# Patient Record
Sex: FEMALE | Race: BLACK OR AFRICAN AMERICAN | NOT HISPANIC OR LATINO | Employment: FULL TIME | URBAN - METROPOLITAN AREA
[De-identification: names, ages, dates, MRNs, and addresses within clinical notes are randomized per-mention and may not be internally consistent; named-entity substitution may affect disease eponyms.]

---

## 2017-05-11 ENCOUNTER — OFFICE VISIT (OUTPATIENT)
Dept: GASTROENTEROLOGY | Facility: CLINIC | Age: 42
End: 2017-05-11
Payer: COMMERCIAL

## 2017-05-11 ENCOUNTER — TELEPHONE (OUTPATIENT)
Dept: GASTROENTEROLOGY | Facility: CLINIC | Age: 42
End: 2017-05-11

## 2017-05-11 VITALS
WEIGHT: 224.19 LBS | SYSTOLIC BLOOD PRESSURE: 111 MMHG | DIASTOLIC BLOOD PRESSURE: 76 MMHG | BODY MASS INDEX: 37.35 KG/M2 | HEIGHT: 65 IN | HEART RATE: 109 BPM

## 2017-05-11 DIAGNOSIS — R14.0 BLOATING: ICD-10-CM

## 2017-05-11 DIAGNOSIS — K59.01 CONSTIPATION BY DELAYED COLONIC TRANSIT: Primary | ICD-10-CM

## 2017-05-11 DIAGNOSIS — K62.5 RECTAL BLEEDING: ICD-10-CM

## 2017-05-11 PROCEDURE — 99244 OFF/OP CNSLTJ NEW/EST MOD 40: CPT | Mod: S$GLB,,, | Performed by: INTERNAL MEDICINE

## 2017-05-11 PROCEDURE — 99999 PR PBB SHADOW E&M-EST. PATIENT-LVL III: CPT | Mod: PBBFAC,,, | Performed by: INTERNAL MEDICINE

## 2017-05-11 RX ORDER — PHENTERMINE HYDROCHLORIDE 37.5 MG/1
37.5 TABLET ORAL
COMMUNITY
End: 2019-12-30 | Stop reason: ALTCHOICE

## 2017-05-11 NOTE — MR AVS SNAPSHOT
[facility]  [facility] [address] [place] [address]  Phone: [phone]  Fax: [phone]                  Aaliyah Romo   2017 2:20 PM   Office Visit    Description:  Female : 1975   Provider:  Jeff Ash Jr., MD   Department:  [facility]           Reason for Visit     Abdominal Pain     Bloated     Constipation     Rectal Bleeding           Diagnoses this Visit        Comments    Constipation by delayed colonic transit    -  Primary     Rectal bleeding         Bloating                To Do List           Goals (5 Years of Data)     None      [facility]     [facility] On [facility] Care Line -  Assistance  Unless otherwise directed by your provider, please contact [facility], our nurse care line that is available for  assistance.     Registered nurses in the [facility] provide: appointment scheduling, clinical advisement, health education, and other advisory services.  Call: [phone] (toll free)               Medications           Message regarding Medications     Verify the changes and/or additions to your medication regime listed below are the same as discussed with your clinician today.  If any of these changes or additions are incorrect, please notify your healthcare provider.             Verify that the below list of medications is an accurate representation of the medications you are currently taking.  If none reported, the list may be blank. If incorrect, please contact your healthcare provider. Carry this list with you in case of emergency.           Current Medications     phentermine (ADIPEX-P) 37.5 mg tablet Take 37.5 mg by mouth before breakfast.    ibuprofen (ADVIL,MOTRIN) 600 MG tablet Take 1 tablet (600 mg total) by mouth every 6 (six) hours as needed for Pain.    naproxen (NAPROSYN) 500 MG tablet Take 1 tablet (500 mg total) by mouth 2 (two) times daily with meals.           Clinical Reference  "Information           Your Vitals Were     BP Pulse Height Weight BMI    111/76 (BP Location: Right arm, Patient Position: Sitting) 109 5' 5" (1.651 m) 101.7 kg (224 lb 3.2 oz) 37.31 kg/m2      Blood Pressure          Most Recent Value    BP  111/76      Allergies as of 5/11/2017     Vancomycin Analogues      Immunizations Administered on Date of Encounter - 5/11/2017     None      [facility] Sign-Up     Activating your [facility] account is as easy as 1-2-3!     1) Visit [email or web address], select Sign Up Now, enter this activation code and your date of birth, then select Next.  [email or web address]  Expires: 6/25/2017  3:29 PM      2) Create a username and password to use when you visit [facility] in the future and select a security question in case you lose your password and select Next.    3) Enter your e-mail address and click Sign Up!    Additional Information  If you have questions, please e-mail [email or web address] or call [phone] to talk to our [facility] staff. Remember, [facility] is NOT to be used for urgent needs. For medical emergencies, dial 911.         Instructions      Colonoscopy     A camera attached to a flexible tube with a viewing lens is used to take video pictures.     Colonoscopy is a test to view the inside of your lower digestive tract (colon and rectum). Sometimes it can show the last part of the small intestine (ileum). During the test, small pieces of tissue may be removed for testing. This is called a biopsy. Small growths, such as polyps, may also be removed.   Why is colonoscopy done?  The test is done to help look for colon cancer. And it can help find the source of abdominal pain, bleeding, and changes in bowel habits. It may be needed once a year, depending on factors such as your:  · Age  · Health history  · Family health history  · Symptoms  · Results from any prior colonoscopy  Risks and possible complications  These include:  · Bleeding               · A puncture or tear in the " colon   · Risks of anesthesia  · A cancer lesion not being seen  Getting ready   To prepare for the test:  · Talk with your healthcare provider about the risks of the test (see below). Also ask your healthcare provider about alternatives to the test.  · Tell your healthcare provider about any medicines you take. Also tell him or her about any health conditions you may have.  · Make sure your rectum and colon are empty for the test. Follow the diet and bowel prep instructions exactly. If you dont, the test may need to be rescheduled.  · Plan for a friend or family member to drive you home after the test.     Colonoscopy provides an inside view of the entire colon.     You may discuss the results with your doctor right away or at a future visit.  During the test   The test is usually done in the hospital on an outpatient basis. This means you go home the same day. The procedure takes about 30 minutes. During that time:  · You are given relaxing (sedating) medicine through an IV line. You may be drowsy, or fully asleep.  · The healthcare provider will first give you a physical exam to check for anal and rectal problems.  · Then the anus is lubricated and the scope inserted.  · If you are awake, you may have a feeling similar to needing to have a bowel movement. You may also feel pressure as air is pumped into the colon. Its OK to pass gas during the procedure.  · Biopsy, polyp removal, or other treatments may be done during the test.  After the test   You may have gas right after the test. It can help to try to pass it to help prevent later bloating. Your healthcare provider may discuss the results with you right away. Or you may need to schedule a follow-up visit to talk about the results. After the test, you can go back to your normal eating and other activities. You may be tired from the sedation and need to rest for a few hours.  Date Last Reviewed: 11/1/2016  © 0020-9875 [employer]. [address] [place] [address]  [place] [address]. All rights reserved. This information is not intended as a substitute for professional medical care. Always follow your healthcare professional's instructions.             Language Assistance Services     ATTENTION: Language assistance services are available, free of charge. Please call [phone].      ATENCIÓN: Si marcellala robin, tiene a robledo disposición servicios gratuitos de asistencia lingüística. Llame al [phone].     CHÚ Ý: N?u b?n nói Ti?ng Vi?t, có các d?ch v? h? tr? ngôn ng? mi?n phí dành cho b?n. G?i s? [phone].         [facility] complies with applicable Federal civil rights laws and does not discriminate on the basis of race, color, national origin, age, disability, or sex.

## 2017-05-11 NOTE — PROGRESS NOTES
Subjective:      Patient ID: Aaliyah Romo is a 41 y.o. female.    Chief Complaint: Abdominal Pain; Bloated; Constipation; and Rectal Bleeding (dark red )    HPI:   Patient 41-year-old female presenting for GI consult.  She is a history of chronic constipation.  Occasionally  Fecal impaction.  Has used MiraLAX occasionally in the past.  Has also used some herbal products likely containing senna.  Indicates she is not currently taking these products.  With constipation she has passed blood per rectum intermittently.  Dark red and at times copious.  GI systems review includes occasional nausea and generalized abdominal bloating and discomfort.  Admits to significant weight gain.  Started Adipex yesterday  Indicates she has had gallbladder stones in the past.  I see no surgical scars.  Has been told she had IBS in the past  Nonsmoker.  Nondrinker.  Family history negative for GI neoplasm.  Had a colonoscopy greater than 10 years ago.    Review of patient's allergies indicates:   Allergen Reactions    Vancomycin analogues Swelling     History reviewed. No pertinent past medical history.  Past Surgical History:   Procedure Laterality Date    BREAST SURGERY      CHOLECYSTECTOMY      COLONOSCOPY      TUBAL LIGATION       History reviewed. No pertinent family history.  Social History     Social History    Marital status: Single     Spouse name: N/A    Number of children: N/A    Years of education: N/A     Occupational History    Not on file.     Social History Main Topics    Smoking status: Never Smoker    Smokeless tobacco: Never Used    Alcohol use No    Drug use: No    Sexual activity: Not on file     Other Topics Concern    Not on file     Social History Narrative    No narrative on file       Review of Systems:  Constitutional: Negative for appetite change.  Weight gain.  Fatigue.    HENT: Negative for trouble swallowing.  Sinus congestion   Eyes: Negative for photophobia.   Respiratory: Negative  "for cough and shortness of breath.   Cardiovascular: Negative for palpitations.   Gastrointestinal: See HPI for details.  Genitourinary: Negative for frequency and hematuria.   Skin: Negative for rash.   Neurological: Negative for weakness .  Positive for headaches.   Musculoskeletal joint pains.  Low back pain.  Hematological: Negative.   Psychiatric/Behavioral: Negative for suicidal ideas and behavioral problems.  Anxiety.    Objective:     /76 (BP Location: Right arm, Patient Position: Sitting)  Pulse 109  Ht 5' 5" (1.651 m)  Wt 101.7 kg (224 lb 3.2 oz)  BMI 37.31 kg/m2    Physical Exam:  Eyes: Pupils are equal, round, and reactive to light.   Neck: Supple. No mass  Cardiovascular: Regular rhythm . No murmur   Pulmonary/Chest: Lungs clear   Abdominal: Soft. No mass palpated. Nontender, no guarding. Positive bowel sounds   Musculoskeletal: No deformity. No edema.   Psychiatric: Alert and oriented    Assessment:     1. Constipation by delayed colonic transit    2. Rectal bleeding    3. Bloating      Plan:     Aaliyah was seen today for abdominal pain, bloated, constipation and rectal bleeding.    Diagnoses and all orders for this visit:    Constipation by delayed colonic transit    Rectal bleeding    Bloating      Plan:  Daily MiraLAX  Citrate of magnesia on a weekly basis  Colonoscopy with extra prep    CC Dr. Allen  "

## 2017-05-11 NOTE — TELEPHONE ENCOUNTER
Patient is scheduled for Colonoscopy at [facility] on 05/16/2017. Prep information was given and explained to the patient at the office visit.

## 2017-05-11 NOTE — PATIENT INSTRUCTIONS
Colonoscopy     A camera attached to a flexible tube with a viewing lens is used to take video pictures.     Colonoscopy is a test to view the inside of your lower digestive tract (colon and rectum). Sometimes it can show the last part of the small intestine (ileum). During the test, small pieces of tissue may be removed for testing. This is called a biopsy. Small growths, such as polyps, may also be removed.   Why is colonoscopy done?  The test is done to help look for colon cancer. And it can help find the source of abdominal pain, bleeding, and changes in bowel habits. It may be needed once a year, depending on factors such as your:  · Age  · Health history  · Family health history  · Symptoms  · Results from any prior colonoscopy  Risks and possible complications  These include:  · Bleeding               · A puncture or tear in the colon   · Risks of anesthesia  · A cancer lesion not being seen  Getting ready   To prepare for the test:  · Talk with your healthcare provider about the risks of the test (see below). Also ask your healthcare provider about alternatives to the test.  · Tell your healthcare provider about any medicines you take. Also tell him or her about any health conditions you may have.  · Make sure your rectum and colon are empty for the test. Follow the diet and bowel prep instructions exactly. If you dont, the test may need to be rescheduled.  · Plan for a friend or family member to drive you home after the test.     Colonoscopy provides an inside view of the entire colon.     You may discuss the results with your doctor right away or at a future visit.  During the test   The test is usually done in the hospital on an outpatient basis. This means you go home the same day. The procedure takes about 30 minutes. During that time:  · You are given relaxing (sedating) medicine through an IV line. You may be drowsy, or fully asleep.  · The healthcare provider will first give you a physical exam to  check for anal and rectal problems.  · Then the anus is lubricated and the scope inserted.  · If you are awake, you may have a feeling similar to needing to have a bowel movement. You may also feel pressure as air is pumped into the colon. Its OK to pass gas during the procedure.  · Biopsy, polyp removal, or other treatments may be done during the test.  After the test   You may have gas right after the test. It can help to try to pass it to help prevent later bloating. Your healthcare provider may discuss the results with you right away. Or you may need to schedule a follow-up visit to talk about the results. After the test, you can go back to your normal eating and other activities. You may be tired from the sedation and need to rest for a few hours.  Date Last Reviewed: 11/1/2016  © 7895-0954 [employer]. [address] [place] [address], [place] [address]. All rights reserved. This information is not intended as a substitute for professional medical care. Always follow your healthcare professional's instructions.

## 2017-05-15 ENCOUNTER — TELEPHONE (OUTPATIENT)
Dept: GASTROENTEROLOGY | Facility: CLINIC | Age: 42
End: 2017-05-15

## 2017-05-15 DIAGNOSIS — K62.5 RECTAL BLEEDING: Primary | ICD-10-CM

## 2017-05-15 NOTE — TELEPHONE ENCOUNTER
Patient is scheduled in [facility] for Colonoscopy on 05/26/2017. Prep information was explained and given to patient at office visit.

## 2017-06-01 ENCOUNTER — TELEPHONE (OUTPATIENT)
Dept: GASTROENTEROLOGY | Facility: CLINIC | Age: 42
End: 2017-06-01

## 2017-06-01 DIAGNOSIS — K62.5 RECTAL BLEEDING: Primary | ICD-10-CM

## 2017-06-01 NOTE — TELEPHONE ENCOUNTER
----- Message from Anshu Otero sent at 5/31/2017 12:15 PM CDT -----  Contact: [phone]/self  Pt requesting to speak with the nurse about reschedule her colonoscopy.  Please advise

## 2017-08-05 ENCOUNTER — HOSPITAL ENCOUNTER (EMERGENCY)
Facility: HOSPITAL | Age: 42
Discharge: HOME | End: 2017-08-05
Attending: EMERGENCY MEDICINE
Payer: COMMERCIAL

## 2017-08-05 VITALS
SYSTOLIC BLOOD PRESSURE: 143 MMHG | OXYGEN SATURATION: 100 % | RESPIRATION RATE: 16 BRPM | DIASTOLIC BLOOD PRESSURE: 73 MMHG | HEIGHT: 65 IN | WEIGHT: 210 LBS | BODY MASS INDEX: 34.99 KG/M2 | TEMPERATURE: 99 F | HEART RATE: 94 BPM

## 2017-08-05 DIAGNOSIS — S80.01XA CONTUSION OF RIGHT KNEE, INITIAL ENCOUNTER: Primary | ICD-10-CM
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
## 2017-08-05 DIAGNOSIS — S93.401A SPRAIN OF RIGHT ANKLE, UNSPECIFIED LIGAMENT, INITIAL ENCOUNTER: ICD-10-CM

## 2017-08-05 LAB — B-HCG UR QL: NEGATIVE

## 2017-08-05 PROCEDURE — 99284 EMERGENCY DEPT VISIT MOD MDM: CPT

## 2017-08-05 PROCEDURE — 81025 URINE PREGNANCY TEST: CPT

## 2017-08-06 NOTE — ED PROVIDER NOTES
Encounter Date: 8/5/2017       History     Chief Complaint   Patient presents with    Fall     Slip and fall, c/o right ankle pain and lower back pain.  No deformity/swelling/ecchymosis noted to ankle, (+) distal pulses and cap refill     This patient is a 41-year-old female.  She was in the store, fell, landed on her right knee.  Is complaining of low back pain, knee pain, ankle pain.  No other injuries.  Did not hit her head, did not lose consciousness, denies chest pain or abdominal pain.          Review of patient's allergies indicates:   Allergen Reactions    Vancomycin analogues Swelling     History reviewed. No pertinent past medical history.  Past Surgical History:   Procedure Laterality Date    BREAST SURGERY      CHOLECYSTECTOMY      COLONOSCOPY      TUBAL LIGATION       History reviewed. No pertinent family history.  Social History   Substance Use Topics    Smoking status: Never Smoker    Smokeless tobacco: Never Used    Alcohol use No     Review of Systems   All other systems reviewed and are negative.      Physical Exam     Initial Vitals [08/05/17 2103]   BP Pulse Resp Temp SpO2   (!) 160/84 94 20 99.4 °F (37.4 °C) 99 %      MAP       109.33         Physical Exam    Nursing note and vitals reviewed.  Constitutional: She appears well-developed and well-nourished. She is not diaphoretic. No distress.   HENT:   Head: Normocephalic and atraumatic.   Right Ear: External ear normal.   Left Ear: External ear normal.   Mouth/Throat: Oropharynx is clear and moist.   Eyes: Conjunctivae and EOM are normal. Pupils are equal, round, and reactive to light.   Neck: Normal range of motion.   No cervical tenderness   Cardiovascular: Normal rate, regular rhythm, normal heart sounds and intact distal pulses.   Pulmonary/Chest: Breath sounds normal. No respiratory distress. She has no wheezes.   Abdominal: Soft. Bowel sounds are normal. She exhibits no distension. There is no tenderness.   Musculoskeletal: She  exhibits tenderness. She exhibits no edema.   Mild tenderness in the region of the right patella.  There is no effusion.  No surface marks such as contusions or abrasions.  All ligaments are stable with varus and valgus stress test, anterior drawer, no joint line tenderness.    Right ankle has mild tenderness anteriorly, no tenderness over the malleoli, Achilles, no joint effusion, full range of motion    Normal distal pulses and sensation    Normal gait   Neurological: She is alert and oriented to person, place, and time. She has normal strength. No sensory deficit.   Skin: Skin is warm and dry.   Psychiatric: She has a normal mood and affect.         ED Course   Procedures  Labs Reviewed   PREGNANCY TEST, URINE RAPID          X-Rays:   Independently Interpreted Readings:   Other Readings:  X-rays of the right knee and ankle were obtained by the triage nurse.  Show no evidence of fracture    Medical Decision Making:   Initial Assessment:   Fall, with knee contusion and minor ankle sprain.  Ibuprofen as needed for pain.  Follow-up with primary care physician.  No evidence of significant ligamentous injury or fracture  Differential Diagnosis:   Contusion  Sprain  Fracture                   ED Course     Clinical Impression:   The primary encounter diagnosis was Contusion of right knee, initial encounter. Diagnoses of Fall and Sprain of right ankle, unspecified ligament, initial encounter were also pertinent to this visit.    Disposition:   Disposition: Discharged  Condition: Stable                        Cullen Silver MD  08/05/17 4925

## 2017-10-11 ENCOUNTER — HOSPITAL ENCOUNTER (EMERGENCY)
Facility: HOSPITAL | Age: 42
Discharge: HOME | End: 2017-10-11
Payer: COMMERCIAL

## 2017-10-11 VITALS
RESPIRATION RATE: 16 BRPM | TEMPERATURE: 98 F | DIASTOLIC BLOOD PRESSURE: 72 MMHG | OXYGEN SATURATION: 98 % | HEART RATE: 91 BPM | SYSTOLIC BLOOD PRESSURE: 112 MMHG

## 2017-10-11 DIAGNOSIS — A59.9 TRICHIMONIASIS: ICD-10-CM

## 2017-10-11 DIAGNOSIS — F41.9 ANXIETY: Primary | ICD-10-CM

## 2017-10-11 LAB
ALBUMIN SERPL BCP-MCNC: 3.9 G/DL
ALP SERPL-CCNC: 148 U/L
ALT SERPL W/O P-5'-P-CCNC: 79 U/L
ANION GAP SERPL CALC-SCNC: 12 MMOL/L
AST SERPL-CCNC: 47 U/L
B-HCG UR QL: NEGATIVE
BASOPHILS # BLD AUTO: 0.05 K/UL
BASOPHILS NFR BLD: 0.5 %
BILIRUB SERPL-MCNC: 0.9 MG/DL
BILIRUB UR QL STRIP: NEGATIVE
BUN SERPL-MCNC: 10 MG/DL
CALCIUM SERPL-MCNC: 9.1 MG/DL
CHLORIDE SERPL-SCNC: 98 MMOL/L
CLARITY UR REFRACT.AUTO: ABNORMAL
CO2 SERPL-SCNC: 29 MMOL/L
COLOR UR AUTO: ABNORMAL
CREAT SERPL-MCNC: 1.07 MG/DL
DIFFERENTIAL METHOD: ABNORMAL
EOSINOPHIL # BLD AUTO: 0.3 K/UL
EOSINOPHIL NFR BLD: 2.6 %
ERYTHROCYTE [DISTWIDTH] IN BLOOD BY AUTOMATED COUNT: 14.2 %
EST. GFR  (AFRICAN AMERICAN): >60 ML/MIN/1.73 M^2
EST. GFR  (NON AFRICAN AMERICAN): >60 ML/MIN/1.73 M^2
GLUCOSE SERPL-MCNC: 101 MG/DL
GLUCOSE UR QL STRIP: NEGATIVE
HCT VFR BLD AUTO: 32.3 %
HGB BLD-MCNC: 11.2 G/DL
HGB UR QL STRIP: ABNORMAL
KETONES UR QL STRIP: NEGATIVE
LEUKOCYTE ESTERASE UR QL STRIP: ABNORMAL
LYMPHOCYTES # BLD AUTO: 2.3 K/UL
LYMPHOCYTES NFR BLD: 24 %
MCH RBC QN AUTO: 28.5 PG
MCHC RBC AUTO-ENTMCNC: 34.7 G/DL
MCV RBC AUTO: 82 FL
MICROSCOPIC COMMENT: ABNORMAL
MONOCYTES # BLD AUTO: 1 K/UL
MONOCYTES NFR BLD: 9.9 %
NEUTROPHILS # BLD AUTO: 5.9 K/UL
NEUTROPHILS NFR BLD: 62.1 %
NITRITE UR QL STRIP: NEGATIVE
PH UR STRIP: 6 [PH] (ref 5–8)
PLATELET # BLD AUTO: 256 K/UL
PMV BLD AUTO: 11.3 FL
POTASSIUM SERPL-SCNC: 3.2 MMOL/L
PROT SERPL-MCNC: 8.3 G/DL
PROT UR QL STRIP: ABNORMAL
RBC # BLD AUTO: 3.93 M/UL
RBC #/AREA URNS AUTO: 20 /HPF (ref 0–4)
SODIUM SERPL-SCNC: 139 MMOL/L
SP GR UR STRIP: 1.01 (ref 1–1.03)
TRICHOMONAS UR QL COMP ASSIST: ABNORMAL
TROPONIN I SERPL DL<=0.01 NG/ML-MCNC: <0.012 NG/ML
URN SPEC COLLECT METH UR: ABNORMAL
UROBILINOGEN UR STRIP-ACNC: ABNORMAL EU/DL
WBC # BLD AUTO: 9.58 K/UL
WBC #/AREA URNS AUTO: 15 /HPF (ref 0–5)

## 2017-10-11 PROCEDURE — 84484 ASSAY OF TROPONIN QUANT: CPT

## 2017-10-11 PROCEDURE — 80053 COMPREHEN METABOLIC PANEL: CPT

## 2017-10-11 PROCEDURE — 99284 EMERGENCY DEPT VISIT MOD MDM: CPT

## 2017-10-11 PROCEDURE — 93005 ELECTROCARDIOGRAM TRACING: CPT

## 2017-10-11 PROCEDURE — 93010 ELECTROCARDIOGRAM REPORT: CPT | Mod: ,,, | Performed by: INTERNAL MEDICINE

## 2017-10-11 PROCEDURE — 85025 COMPLETE CBC W/AUTO DIFF WBC: CPT

## 2017-10-11 PROCEDURE — 81000 URINALYSIS NONAUTO W/SCOPE: CPT

## 2017-10-11 PROCEDURE — 81025 URINE PREGNANCY TEST: CPT

## 2017-10-11 RX ORDER — METRONIDAZOLE 500 MG/1
500 TABLET ORAL EVERY 12 HOURS
Qty: 14 TABLET | Refills: 0 | Status: SHIPPED | OUTPATIENT
Start: 2017-10-11 | End: 2017-10-18

## 2017-10-11 RX ORDER — ALPRAZOLAM 0.5 MG/1
0.5 TABLET ORAL 2 TIMES DAILY PRN
Qty: 30 TABLET | Refills: 0 | Status: SHIPPED | OUTPATIENT
Start: 2017-10-11 | End: 2017-11-10

## 2017-10-11 NOTE — ED NOTES
Pt reports she feels better at this time. Pt has only eaten a banana at this time all day pt was at work when this happen.

## 2017-10-11 NOTE — ED PROVIDER NOTES
"Encounter Date: 10/11/2017       History     Chief Complaint   Patient presents with    Dizziness     Called EMS  because she was dizzy and almost "passed out" but did not. Recently getting over the flu and stopped taking her blood pressure medication a week because she thought she felt okay and didn't need it anymore.      42-year-old female presents to emergency room complaining of dizziness while at work today.  Patient states she began to experience left-sided arm tingling and dizziness while walking at work.  Denies any chest pain or shortness of breath.  Denies any injury.  States symptoms resolved after sitting.  Reports nausea but no vomiting.  States she just felt like she was about to passed out.  Patient reports a history of hypertension states she stopped taking her medications a week ago because she felt better and did not need them anymore.  Does not regularly check blood pressure.  Denies any blurry vision.  Denies any headache or head injury.          Review of patient's allergies indicates:   Allergen Reactions    Vancomycin analogues Swelling     History reviewed. No pertinent past medical history.  Past Surgical History:   Procedure Laterality Date    BREAST SURGERY      CHOLECYSTECTOMY      COLONOSCOPY      TUBAL LIGATION       History reviewed. No pertinent family history.  Social History   Substance Use Topics    Smoking status: Never Smoker    Smokeless tobacco: Never Used    Alcohol use No     Review of Systems   Constitutional: Negative for fever.   HENT: Negative for sore throat.    Respiratory: Negative for shortness of breath.    Cardiovascular: Negative for chest pain.   Gastrointestinal: Negative for nausea.   Genitourinary: Negative for dysuria.   Musculoskeletal: Negative for back pain.   Skin: Negative for rash.   Neurological: Positive for dizziness. Negative for weakness.   Hematological: Does not bruise/bleed easily.   All other systems reviewed and are " negative.      Physical Exam     Initial Vitals [10/11/17 1251]   BP Pulse Resp Temp SpO2   131/74 85 16 98 °F (36.7 °C) 98 %      MAP       93         Physical Exam    Nursing note and vitals reviewed.  Constitutional: She appears well-developed and well-nourished. No distress.   HENT:   Head: Normocephalic.   Eyes: Conjunctivae are normal.   Neck: Normal range of motion. Neck supple.   Cardiovascular: Normal rate.   Pulmonary/Chest: Breath sounds normal. No respiratory distress.   Abdominal: Soft. Bowel sounds are normal. She exhibits no distension and no abdominal bruit. There is no tenderness. There is no rebound and no guarding. No hernia.   Neurological: She is alert and oriented to person, place, and time.   Skin: Skin is warm and dry.   Psychiatric: Her behavior is normal. Judgment and thought content normal.   Tearful during assessment         ED Course   Procedures  Labs Reviewed   CBC W/ AUTO DIFFERENTIAL - Abnormal; Notable for the following:        Result Value    RBC 3.93 (*)     Hemoglobin 11.2 (*)     Hematocrit 32.3 (*)     All other components within normal limits   COMPREHENSIVE METABOLIC PANEL - Abnormal; Notable for the following:     Potassium 3.2 (*)     Alkaline Phosphatase 148 (*)     AST 47 (*)     ALT 79 (*)     All other components within normal limits   URINALYSIS - Abnormal; Notable for the following:     Appearance, UA Hazy (*)     Protein, UA Trace (*)     Occult Blood UA 3+ (*)     Urobilinogen, UA 4.0-6.0 (*)     Leukocytes, UA 2+ (*)     All other components within normal limits   URINALYSIS MICROSCOPIC - Abnormal; Notable for the following:     RBC, UA 20 (*)     WBC, UA 15 (*)     Trichomonas, UA Few (*)     All other components within normal limits   TROPONIN I   PREGNANCY TEST, URINE RAPID   PREGNANCY TEST, URINE RAPID             Medical Decision Making:   Initial Assessment:   42-year-old female presents to emergency room complaining of dizziness while at work today.  Patient  states she began to experience left-sided arm tingling and dizziness while walking at work.  Denies any chest pain or shortness of breath.  Denies any injury.  States symptoms resolved after sitting.  Reports nausea but no vomiting.  States she just felt like she was about to passed out.  Patient reports a history of hypertension states she stopped taking her medications a week ago because she felt better and did not need them anymore.  Does not regularly check blood pressure.  Denies any blurry vision.  Denies any headache or head injury.  Differential Diagnosis:   Vertigo, Ménière's disease, hypertension, angina, MI, CHF, CAD  Clinical Tests:   Lab Tests: Ordered and Reviewed  Radiological Study: Ordered and Reviewed  Medical Tests: Ordered and Reviewed  ED Management:  Present discussed the results with patient patient states she has been under a lot of stress lately due to the death of her nephew she raised.  States she returned to work this week but does not feel like she was ready.  States she is concerned about several family members and still attempting to do with the death of her boyfriend one year ago today.  Patient very tearful during reassessment.  States she has not been eating due to stress.  Denies suicidal or homicidal ideation.  Denies hallucinations.  States she just feels very sad at this moment.  Patient's workup is unremarkable.  I will treat the patient for Trichomonas was found a urine.  Patient has no symptoms.  I'll also give the patient medication for anxiety instructed patient to follow-up with counseling services in the area.  Return to emergency room if any symptoms worsen.  Eating well and hydrate well.  Rest.  Follow with primary care as needed.              Attending Attestation:     Physician Attestation Statement for NP/PA:   I discussed this assessment and plan of this patient with the NP/PA, but I did not personally examine the patient. The face to face encounter was performed by  the NP/PA.                  ED Course      Clinical Impression:   The primary encounter diagnosis was Anxiety. A diagnosis of Trichimoniasis was also pertinent to this visit.    Disposition:   Disposition: Discharged  Condition: Stable                        Raquel Dick NP  10/13/17 1649       Cullen Silver MD  10/21/17 0756

## 2017-10-19 ENCOUNTER — HOSPITAL ENCOUNTER (EMERGENCY)
Facility: HOSPITAL | Age: 42
Discharge: HOME | End: 2017-10-19
Attending: EMERGENCY MEDICINE
Payer: COMMERCIAL

## 2017-10-19 VITALS
WEIGHT: 218 LBS | DIASTOLIC BLOOD PRESSURE: 81 MMHG | HEIGHT: 65 IN | SYSTOLIC BLOOD PRESSURE: 133 MMHG | TEMPERATURE: 99 F | OXYGEN SATURATION: 100 % | BODY MASS INDEX: 36.32 KG/M2 | RESPIRATION RATE: 16 BRPM | HEART RATE: 85 BPM

## 2017-10-19 DIAGNOSIS — G51.0 BELL'S PALSY: Primary | ICD-10-CM

## 2017-10-19 LAB
ALBUMIN SERPL BCP-MCNC: 4.1 G/DL
ALP SERPL-CCNC: 84 U/L
ALT SERPL W/O P-5'-P-CCNC: 31 U/L
ANION GAP SERPL CALC-SCNC: 11 MMOL/L
AST SERPL-CCNC: 23 U/L
BASOPHILS # BLD AUTO: 0.02 K/UL
BASOPHILS NFR BLD: 0.2 %
BILIRUB SERPL-MCNC: 0.6 MG/DL
BUN SERPL-MCNC: 8 MG/DL
CALCIUM SERPL-MCNC: 9.5 MG/DL
CHLORIDE SERPL-SCNC: 101 MMOL/L
CO2 SERPL-SCNC: 28 MMOL/L
CREAT SERPL-MCNC: 0.95 MG/DL
DIFFERENTIAL METHOD: ABNORMAL
EOSINOPHIL # BLD AUTO: 0.1 K/UL
EOSINOPHIL NFR BLD: 1.3 %
ERYTHROCYTE [DISTWIDTH] IN BLOOD BY AUTOMATED COUNT: 14.6 %
EST. GFR  (AFRICAN AMERICAN): >60 ML/MIN/1.73 M^2
EST. GFR  (NON AFRICAN AMERICAN): >60 ML/MIN/1.73 M^2
GLUCOSE SERPL-MCNC: 112 MG/DL
HCT VFR BLD AUTO: 34.9 %
HGB BLD-MCNC: 11.4 G/DL
INR PPP: 1.3
LYMPHOCYTES # BLD AUTO: 3.2 K/UL
LYMPHOCYTES NFR BLD: 38.1 %
MCH RBC QN AUTO: 28.3 PG
MCHC RBC AUTO-ENTMCNC: 32.7 G/DL
MCV RBC AUTO: 87 FL
MONOCYTES # BLD AUTO: 0.5 K/UL
MONOCYTES NFR BLD: 5.9 %
NEUTROPHILS # BLD AUTO: 4.5 K/UL
NEUTROPHILS NFR BLD: 54.1 %
PLATELET # BLD AUTO: 400 K/UL
PMV BLD AUTO: 10.2 FL
POCT GLUCOSE: 116 MG/DL (ref 70–110)
POTASSIUM SERPL-SCNC: 3.7 MMOL/L
PROT SERPL-MCNC: 8.4 G/DL
PROTHROMBIN TIME: 13.7 SEC
RBC # BLD AUTO: 4.03 M/UL
SODIUM SERPL-SCNC: 140 MMOL/L
TSH SERPL DL<=0.005 MIU/L-ACNC: 1.1 UIU/ML
WBC # BLD AUTO: 8.35 K/UL

## 2017-10-19 PROCEDURE — 94760 N-INVAS EAR/PLS OXIMETRY 1: CPT

## 2017-10-19 PROCEDURE — 84443 ASSAY THYROID STIM HORMONE: CPT

## 2017-10-19 PROCEDURE — 80053 COMPREHEN METABOLIC PANEL: CPT

## 2017-10-19 PROCEDURE — 82962 GLUCOSE BLOOD TEST: CPT

## 2017-10-19 PROCEDURE — 99285 EMERGENCY DEPT VISIT HI MDM: CPT | Mod: 25

## 2017-10-19 PROCEDURE — 99203 OFFICE O/P NEW LOW 30 MIN: CPT | Mod: GT,,, | Performed by: PSYCHIATRY & NEUROLOGY

## 2017-10-19 PROCEDURE — 93005 ELECTROCARDIOGRAM TRACING: CPT

## 2017-10-19 PROCEDURE — 93010 ELECTROCARDIOGRAM REPORT: CPT | Mod: ,,, | Performed by: INTERNAL MEDICINE

## 2017-10-19 PROCEDURE — 85610 PROTHROMBIN TIME: CPT

## 2017-10-19 PROCEDURE — 27000221 HC OXYGEN, UP TO 24 HOURS

## 2017-10-19 PROCEDURE — 85025 COMPLETE CBC W/AUTO DIFF WBC: CPT

## 2017-10-19 RX ORDER — METHYLPREDNISOLONE 4 MG/1
TABLET ORAL
Qty: 1 PACKAGE | Refills: 0 | Status: SHIPPED | OUTPATIENT
Start: 2017-10-19 | End: 2017-11-09

## 2017-10-19 RX ORDER — HYDROCHLOROTHIAZIDE 12.5 MG/1
12.5 TABLET ORAL DAILY
COMMUNITY
End: 2022-04-13 | Stop reason: CLARIF

## 2017-10-19 RX ORDER — ASPIRIN 325 MG
325 TABLET ORAL
Status: DISCONTINUED | OUTPATIENT
Start: 2017-10-19 | End: 2017-10-19

## 2017-10-19 NOTE — HPI
41 y/o female who has been under a great deal of stress over the last few weeks and is also recovering from a significant flu illness last week. Developed HA yesterday and some blurred vision last nigh. This morning awoke with left facial weakness.

## 2017-10-19 NOTE — ASSESSMENT & PLAN NOTE
Patient has facial palsy with hyperauscusis and loss of taste. Left platysmas is paretic, All c/w Bell's on left.  Treat with steroids and anti-virals.

## 2017-10-19 NOTE — ED TRIAGE NOTES
Pt reports headache that started last night. Pt reports when she woke up with morning her face was not symmetrical. Left sided facial droop noted.

## 2017-10-19 NOTE — SUBJECTIVE & OBJECTIVE
Woke up with symptoms?: yes  Last known normal: Last known well (date and time)::  10/19/4512-4907  Recent bleeding noted: yes  Does the patient take any Blood Thinners? no  CT READ: Yes  No hemmorhage. No mass effect. No early infarct signs.     Past Medical History: hypertension and hyperlipidemia    Past Surgical History: no major surgeries within the last 2 weeks    Family History: no relevant family history    Social History: no smoking, no drinking, no drugs    Medications: antihypertensives    Allergies: No iodinated contrast allergy    Vancomycin Analogues    Review of Systems   Neurological: Positive for facial asymmetry and headaches.   All other systems reviewed and are negative.    Objective:   Vitals: BP: 138/82    Physical Exam   Constitutional: She is oriented to person, place, and time. She appears well-developed and well-nourished.   HENT:   Head: Normocephalic and atraumatic.   Eyes: EOM are normal. Pupils are equal, round, and reactive to light.   Neck: Normal range of motion. Neck supple.   Cardiovascular: Normal rate and regular rhythm.    Pulmonary/Chest: Effort normal.   Musculoskeletal: Normal range of motion.   Neurological: She is alert and oriented to person, place, and time. A cranial nerve deficit is present. GCS eye subscore is 4 - spontaneous. GCS verbal subscore is 5 - oriented. GCS motor subscore is 6 - obeys commands.   Vitals reviewed.      NIH Stroke Scale:  Interval: baseline (upon arrival/admit)  Level of Consciousness: 0 - alert  LOC Questions: 0 - answers both correctly  LOC Commands: 0 - performs both correctly  Best Gaze: 0 - normal  Visual: 0 - no visual loss  Facial Palsy: 2 - partial  Motor Left Arm: 0 - no drift  Motor Right Arm: 0 - no drift  Motor Left Le - no drift  Motor Right Le - no drift  Limb Ataxia: 0 - absent  Sensory: 0 - normal  Best Language: 0 - no aphasia  Dysarthria: 0 - normal articulation  Extinction and Inattention: 0 - no neglect  NIH Stroke  Scale Total: 2  Guy Coma Scale:  Best Eye Response: 4 - spontaneous  Best Motor Response: 6 - obeys commands  Best Verbal Response: 5 - oriented  Guy Coma Scale Total: 15  Modified Marilyn Scale:   Timeline: Prior to symptoms onset  Modified Glynn Score: 0 - no symptoms    ABCD2 Scale for TIA:   Age > or = 60: 0 - no  B/P or = 140/9 at Initial Evaluation: 1 - yes  Clinical Features of TIA: 0 - other symptoms  Duration of Symptoms: 2 - > or equal to 60 minutes  Diabetes Mellitus in History: 0 - no  ABCD2 Scale Total: 3  [id number] Scale:   Age: 0 - < 65 years old  CHF History: 0 - no  HTN History: 1 - yes  Stroke/TIA/Thromboembolism History: 0 - no  Vascular Disease History: 0 - no  Diabetes Mellitus in History: 0 - no  Female: 1 - yes  [id number] Scale Total: 2

## 2017-10-19 NOTE — ED PROVIDER NOTES
Encounter Date: 10/19/2017       History     Chief Complaint   Patient presents with    Facial Droop     Pt reports headache that started last night. Pt reports when she woke up with morning her face was not symmetrical. Left sided facial droop noted.    Headache     The history is provided by the patient.   Neurologic Problem   The primary symptoms include headaches, paresthesias, focal weakness and speech change. Primary symptoms do not include syncope, loss of consciousness, altered mental status, seizures, dizziness, visual change, loss of sensation, memory loss, fever, nausea or vomiting. The symptoms began 3 to 5 hours ago. The episode lasted 3 hours. The symptoms are unchanged. The neurological symptoms are focal. The symptoms occurred while sleeping.   The headache began yesterday. Headache is a new problem. The headache is present rarely. The pain from the headache is at a severity of 2/10. Location/region(s) of the headache: right unilateral. The headache is associated with neck stiffness and paresthesias. The headache is not associated with aura, photophobia or visual change.     Additional symptoms include neck stiffness. Additional symptoms do not include photophobia, aura, hallucinations or nystagmus. Medical issues do not include seizures, cerebral vascular accident, cancer, alcohol use, drug use, diabetes or hypertension.     Review of patient's allergies indicates:   Allergen Reactions    Vancomycin analogues Swelling     Past Medical History:   Diagnosis Date    Anxiety     Hypertension      Past Surgical History:   Procedure Laterality Date    BREAST SURGERY      CHOLECYSTECTOMY      COLONOSCOPY      TUBAL LIGATION       History reviewed. No pertinent family history.  Social History   Substance Use Topics    Smoking status: Never Smoker    Smokeless tobacco: Never Used    Alcohol use No     Review of Systems   Constitutional: Negative for fever.   Eyes: Negative for photophobia.    Cardiovascular: Negative for syncope.   Gastrointestinal: Negative for nausea and vomiting.   Musculoskeletal: Positive for neck stiffness.   Neurological: Positive for speech change, focal weakness, facial asymmetry, headaches and paresthesias. Negative for dizziness, seizures and loss of consciousness.   Psychiatric/Behavioral: Negative for hallucinations and memory loss.   All other systems reviewed and are negative.      Physical Exam     Initial Vitals [10/19/17 1017]   BP Pulse Resp Temp SpO2   (!) 169/90 99 20 -- 100 %      MAP       116.33         Physical Exam    Nursing note and vitals reviewed.  Constitutional: She appears well-developed and well-nourished.   HENT:   Head: Normocephalic and atraumatic.   Eyes: EOM are normal.   Neck: Normal range of motion. Neck supple. Thyromegaly present. JVD present.   Cardiovascular: Normal rate, regular rhythm, normal heart sounds and intact distal pulses.   Pulmonary/Chest: Breath sounds normal.   Abdominal: Soft.   Musculoskeletal: Normal range of motion.   Lymphadenopathy:     She has cervical adenopathy.   Neurological: No cranial nerve deficit. GCS eye subscore is 4. GCS verbal subscore is 5. GCS motor subscore is 6.   Patient has a facial droop most prominent at her mouth.  This is on the left.  The forehead is spared in this scenario.  Most likely this is a Bell's palsy but we will get the telemetry stroke involved   Skin: Skin is warm and dry. Capillary refill takes less than 2 seconds.   Psychiatric: She has a normal mood and affect. Her behavior is normal. Judgment and thought content normal.         ED Course   Procedures  Labs Reviewed   CBC W/ AUTO DIFFERENTIAL - Abnormal; Notable for the following:        Result Value    Hemoglobin 11.4 (*)     Hematocrit 34.9 (*)     RDW 14.6 (*)     Platelets 400 (*)     All other components within normal limits   POCT GLUCOSE - Abnormal; Notable for the following:     POCT Glucose 116 (*)     All other components  within normal limits   COMPREHENSIVE METABOLIC PANEL   PROTIME-INR   TSH   TROPONIN I   POCT GLUCOSE     EKG Readings: (Independently Interpreted)   Rhythm: Normal Sinus Rhythm. Heart Rate: 99. Ectopy: No Ectopy. Conduction: Normal. ST Segments: Normal ST Segments. T Waves: Normal. Clinical Impression: Normal Sinus Rhythm          Medical Decision Making:   ED Management:  I spoke with Dr. Beatty on the neurology telemetry stroke consult team.  She feels that this is about Bell's palsy as well as accepted the patient can go home on steroids                   ED Course      Clinical Impression:   The encounter diagnosis was Bell's palsy.    Disposition:   Disposition: Discharged  Condition: Stable                        Siena Muhammad MD  10/19/17 2519

## 2017-10-19 NOTE — CONSULTS
[facility]  Vascular Neurology  Comprehensive Stroke Center  Tele-Consultation Note    Teleconsult Time Documentation   Consults    Consulting Provider: Spoke Physician:: Sabina    Patient Location: [facility]  Spoke hospital nurse at bedside with patient assisting consultant.         Patient information was obtained from patient.       Subjective:     History of Present Illness:  41 y/o female who has been under a great deal of stress over the last few weeks and is also recovering from a significant flu illness last week. Developed HA yesterday and some blurred vision last nigh. This morning awoke with left facial weakness.      Woke up with symptoms?: yes  Last known normal: Last known well (date and time)::  10/19/9770-7991  Recent bleeding noted: yes  Does the patient take any Blood Thinners? no  CT READ: Yes  No hemmorhage. No mass effect. No early infarct signs.     Past Medical History: hypertension and hyperlipidemia    Past Surgical History: no major surgeries within the last 2 weeks    Family History: no relevant family history    Social History: no smoking, no drinking, no drugs    Medications: antihypertensives    Allergies: No iodinated contrast allergy    Vancomycin Analogues    Review of Systems   Neurological: Positive for facial asymmetry and headaches.   All other systems reviewed and are negative.    Objective:   Vitals: BP: 138/82    Physical Exam   Constitutional: She is oriented to person, place, and time. She appears well-developed and well-nourished.   HENT:   Head: Normocephalic and atraumatic.   Eyes: EOM are normal. Pupils are equal, round, and reactive to light.   Neck: Normal range of motion. Neck supple.   Cardiovascular: Normal rate and regular rhythm.    Pulmonary/Chest: Effort normal.   Musculoskeletal: Normal range of motion.   Neurological: She is alert and oriented to person, place, and time. A cranial nerve  "deficit is present. GCS eye subscore is 4 - spontaneous. GCS verbal subscore is 5 - oriented. GCS motor subscore is 6 - obeys commands.   Vitals reviewed.      NIH Stroke Scale:  Interval: baseline (upon arrival/admit)  Level of Consciousness: 0 - alert  LOC Questions: 0 - answers both correctly  LOC Commands: 0 - performs both correctly  Best Gaze: 0 - normal  Visual: 0 - no visual loss  Facial Palsy: 2 - partial  Motor Left Arm: 0 - no drift  Motor Right Arm: 0 - no drift  Motor Left Le - no drift  Motor Right Le - no drift  Limb Ataxia: 0 - absent  Sensory: 0 - normal  Best Language: 0 - no aphasia  Dysarthria: 0 - normal articulation  Extinction and Inattention: 0 - no neglect  NIH Stroke Scale Total: 2  Duncan Coma Scale:  Best Eye Response: 4 - spontaneous  Best Motor Response: 6 - obeys commands  Best Verbal Response: 5 - oriented  Duncan Coma Scale Total: 15  Modified Jenkins Scale:   Timeline: Prior to symptoms onset  Modified Jenkins Score: 0 - no symptoms    ABCD2 Scale for TIA:   Age > or = 60: 0 - no  B/P or = 140/9 at Initial Evaluation: 1 - yes  Clinical Features of TIA: 0 - other symptoms  Duration of Symptoms: 2 - > or equal to 60 minutes  Diabetes Mellitus in History: 0 - no  ABCD2 Scale Total: 3  [id number] Scale:   Age: 0 - < 65 years old  CHF History: 0 - no  HTN History: 1 - yes  Stroke/TIA/Thromboembolism History: 0 - no  Vascular Disease History: 0 - no  Diabetes Mellitus in History: 0 - no  Female: 1 - yes  [id number] Scale Total: 2            Assessment/Plan:     Diagnoses:   Left-sided Bell's palsy    Patient has facial palsy with hyperauscusis and loss of taste. Left platysmas is paretic, All c/w Bell's on left.  Treat with steroids and anti-virals.            Blood pressure 138/88, pulse 105, resp. rate 18, height 5' 5" (1.651 m), weight 98.9 kg (218 lb), last menstrual period 10/05/2017, SpO2 100 %, not currently breastfeeding.  Alteplase Eligible?: No  Alteplase " Recommendation: Alteplase not recommended due to Bell's Palsy  Possible Interventional Revascularization Candidate? No; No large vessel occlusion    Recommendation: NPO until after pass dysphagia screen. Diagnostic studies: MRI head without contrast to assess brain parenchyma    Disposition Recommendation: discharge from ED    Recommended the emergency room physician to have a brief discussion with the patient and/or family if available regarding the risks and benefits of treatment, and to briefly document the occurrence of that discussion in his clinical encounter note.     The attending portion of this evaluation, treatment, and documentation was performed per Brittany Carmona MD via audiovisual.    Billing code:  (non-stroke, some mimics)    · This patient has neurological symptom(s)/condition/illness, with minimal potential for morbidity and mortality.  · There is a low probability for acute neurological change leading to clinical and possibly life-threatening deterioration requiring highest level of physician preparedness for urgent intervention.  · Care was coordinated with other physicians involved in the patient's care.  · Radiologic studies and laboratory data were reviewed and interpreted, and plan of care was re-assessed based on the results.  · Diagnosis, treatment options and prognosis may have been discussed with the patient and/or family members or caregiver.        Brittany Carmona MD  Comprehensive Stroke Center  Vascular Neurology   [facility]

## 2017-10-25 ENCOUNTER — HOSPITAL ENCOUNTER (EMERGENCY)
Facility: HOSPITAL | Age: 42
Discharge: HOME | End: 2017-10-25
Attending: FAMILY MEDICINE
Payer: COMMERCIAL

## 2017-10-25 VITALS
WEIGHT: 216 LBS | RESPIRATION RATE: 16 BRPM | BODY MASS INDEX: 35.99 KG/M2 | HEIGHT: 65 IN | HEART RATE: 71 BPM | SYSTOLIC BLOOD PRESSURE: 128 MMHG | TEMPERATURE: 98 F | DIASTOLIC BLOOD PRESSURE: 78 MMHG

## 2017-10-25 DIAGNOSIS — R51.9 NONINTRACTABLE HEADACHE, UNSPECIFIED CHRONICITY PATTERN, UNSPECIFIED HEADACHE TYPE: Primary | ICD-10-CM

## 2017-10-25 PROCEDURE — 25000003 PHARM REV CODE 250: Performed by: FAMILY MEDICINE

## 2017-10-25 PROCEDURE — 99283 EMERGENCY DEPT VISIT LOW MDM: CPT

## 2017-10-25 RX ORDER — KETOROLAC TROMETHAMINE 10 MG/1
10 TABLET, FILM COATED ORAL
Status: COMPLETED | OUTPATIENT
Start: 2017-10-25 | End: 2017-10-25

## 2017-10-25 RX ADMIN — KETOROLAC TROMETHAMINE 10 MG: 10 TABLET, FILM COATED ORAL at 10:10

## 2017-10-25 NOTE — ED PROVIDER NOTES
Encounter Date: 10/25/2017       History     Chief Complaint   Patient presents with    Headache     multiple complaints. states she has headaches, diagnosed with bell's palsy , also has right ear pain.      Patient has multiple complaints today.  She complains of mild headache and right ear pain and also she had Bell's [place] her right side 2 days ago.  Patient also says she is weak and wants to take off from work.  Denies any nausea or vomiting.  No blurring of vision.  Patient also has history of anxiety and says she lost her nephew and is feeling anxious.  Patient says she's been seen by her PCP and prescribed pain medication but she did not go to the pharmacy to fill it yet.  He is mainly here for her excuse.      The history is provided by the patient.     Review of patient's allergies indicates:   Allergen Reactions    Vancomycin analogues Swelling     Past Medical History:   Diagnosis Date    Anxiety     Hypertension      Past Surgical History:   Procedure Laterality Date    BREAST SURGERY      CHOLECYSTECTOMY      COLONOSCOPY      TUBAL LIGATION       No family history on file.  Social History   Substance Use Topics    Smoking status: Never Smoker    Smokeless tobacco: Never Used    Alcohol use No     Review of Systems   Constitutional: Negative for activity change, appetite change and fever.   HENT: Negative for congestion, ear pain, facial swelling, sinus pain, sinus pressure and sore throat.    Eyes: Negative for pain, discharge and itching.   Respiratory: Negative for cough, shortness of breath and wheezing.    Cardiovascular: Negative for chest pain and palpitations.   Gastrointestinal: Negative for abdominal distention, abdominal pain, diarrhea and nausea.   Genitourinary: Negative for dysuria, flank pain and frequency.   Musculoskeletal: Negative for back pain, gait problem and myalgias.   Neurological: Positive for facial asymmetry and headaches. Negative for dizziness, tremors, seizures,  speech difficulty, light-headedness and numbness.   All other systems reviewed and are negative.      Physical Exam     Initial Vitals [10/25/17 1007]   BP Pulse Resp Temp SpO2   126/75 78 18 98.4 °F (36.9 °C) --      MAP       92         Physical Exam    Nursing note and vitals reviewed.  Constitutional: She appears well-developed and well-nourished.   HENT:   Head: Normocephalic.   Right Ear: External ear normal.   Left Ear: External ear normal.   Nose: Nose normal.   Mouth/Throat: Oropharynx is clear and moist.   Eyes: Conjunctivae and EOM are normal. Pupils are equal, round, and reactive to light.   Neck: Normal range of motion.   Cardiovascular: Normal rate, normal heart sounds and intact distal pulses.   Pulmonary/Chest: Breath sounds normal. No respiratory distress. She has no wheezes. She has no rhonchi. She has no rales. She exhibits no tenderness.   Abdominal: Soft. Bowel sounds are normal.   Musculoskeletal: Normal range of motion.   Neurological: She is alert and oriented to person, place, and time. She has normal strength and normal reflexes. She displays normal reflexes. She displays a negative Romberg sign. GCS eye subscore is 4. GCS verbal subscore is 5. GCS motor subscore is 6.   Right facial pain as he had a CT negative   Skin: Skin is warm. Capillary refill takes less than 2 seconds.         ED Course   Procedures  Labs Reviewed - No data to display          Medical Decision Making:   Initial Assessment:   Pt presents with multiple symptoms- has been diagnosed with bells palsy.  Headache and anxiety.  Differential Diagnosis:   CVA, [place] palsy, Headache, migraine, anxiety.  ED Management:  Pt symptoms have improved , no acute change in neurological evaluation.  Pt is advised to f/u PCP and PT/OT for bells palsy.  Pt return to ED with any serious headaches , nausea, vomiting or new weakness.                   ED Course      Clinical Impression:   The encounter diagnosis was Nonintractable headache,  unspecified chronicity pattern, unspecified headache type.    Disposition:   Disposition: Discharged  Condition: Bernice Wagner MD  11/03/17 0042

## 2017-12-04 ENCOUNTER — CLINICAL SUPPORT (OUTPATIENT)
Dept: URGENT CARE | Facility: CLINIC | Age: 42
End: 2017-12-04

## 2017-12-04 DIAGNOSIS — Z23 ENCOUNTER FOR IMMUNIZATION: Primary | ICD-10-CM

## 2017-12-04 PROCEDURE — 86580 TB INTRADERMAL TEST: CPT | Mod: S$GLB,,,

## 2017-12-07 ENCOUNTER — CLINICAL SUPPORT (OUTPATIENT)
Dept: URGENT CARE | Facility: CLINIC | Age: 42
End: 2017-12-07

## 2017-12-07 DIAGNOSIS — Z23 ENCOUNTER FOR IMMUNIZATION: Primary | ICD-10-CM

## 2017-12-07 LAB
TB INDURATION - 48 HR READ: NORMAL MM
TB INDURATION - 72 HR READ: NORMAL MM
TB SKIN TEST - 48 HR READ: NEGATIVE
TB SKIN TEST - 72 HR READ: NORMAL

## 2017-12-12 ENCOUNTER — HOSPITAL ENCOUNTER (EMERGENCY)
Facility: HOSPITAL | Age: 42
Discharge: HOME | End: 2017-12-12
Payer: COMMERCIAL

## 2017-12-12 VITALS
OXYGEN SATURATION: 98 % | HEART RATE: 105 BPM | WEIGHT: 198 LBS | SYSTOLIC BLOOD PRESSURE: 132 MMHG | BODY MASS INDEX: 32.95 KG/M2 | DIASTOLIC BLOOD PRESSURE: 80 MMHG | TEMPERATURE: 98 F | RESPIRATION RATE: 18 BRPM

## 2017-12-12 DIAGNOSIS — M79.2 NEURALGIA: ICD-10-CM

## 2017-12-12 DIAGNOSIS — B02.29 POSTHERPETIC NEURALGIA: Primary | ICD-10-CM

## 2017-12-12 DIAGNOSIS — J02.8 SORE THROAT (VIRAL): ICD-10-CM

## 2017-12-12 DIAGNOSIS — B97.89 SORE THROAT (VIRAL): ICD-10-CM

## 2017-12-12 LAB — DEPRECATED S PYO AG THROAT QL EIA: NEGATIVE

## 2017-12-12 PROCEDURE — 87880 STREP A ASSAY W/OPTIC: CPT

## 2017-12-12 PROCEDURE — 87081 CULTURE SCREEN ONLY: CPT

## 2017-12-12 PROCEDURE — 99283 EMERGENCY DEPT VISIT LOW MDM: CPT

## 2017-12-12 RX ORDER — METHYLPREDNISOLONE 4 MG/1
TABLET ORAL
Qty: 1 PACKAGE | Refills: 0 | Status: SHIPPED | OUTPATIENT
Start: 2017-12-12 | End: 2018-01-02

## 2017-12-12 RX ORDER — GABAPENTIN 300 MG/1
300 CAPSULE ORAL 3 TIMES DAILY
Qty: 90 CAPSULE | Refills: 0 | Status: SHIPPED | OUTPATIENT
Start: 2017-12-12 | End: 2018-12-12

## 2017-12-13 NOTE — ED PROVIDER NOTES
Encounter Date: 12/12/2017       History     Chief Complaint   Patient presents with    Headache     headache that started 2 weeks ago, recently dx. with bells palsy. sore throat for 2 days    Sore Throat     42-year-old female presents to emergency room complaining of facial pain and headache and sore throat for the past one week.  Patient states she was recently diagnosed with Bell's palsy approximately 2 weeks ago and began to experience facial pain as the symptoms from Bell's palsy resolved.  Patient states yesterday she began to have a sore throat and runny nose.  Has not taken any medications for symptoms.  Patient denies any fever.  Denies any cough.          Review of patient's allergies indicates:   Allergen Reactions    Vancomycin analogues Swelling     Past Medical History:   Diagnosis Date    Anxiety     Bell's palsy     Hypertension      Past Surgical History:   Procedure Laterality Date    BREAST SURGERY      CHOLECYSTECTOMY      COLONOSCOPY      TUBAL LIGATION       History reviewed. No pertinent family history.  Social History   Substance Use Topics    Smoking status: Never Smoker    Smokeless tobacco: Never Used    Alcohol use No     Review of Systems   Constitutional: Negative for fever.   HENT: Positive for sore throat.    Respiratory: Negative for shortness of breath.    Cardiovascular: Negative for chest pain.   Gastrointestinal: Negative for nausea.   Genitourinary: Negative for dysuria.   Musculoskeletal: Negative for back pain.   Skin: Negative for rash.   Neurological: Positive for headaches. Negative for weakness.   Hematological: Does not bruise/bleed easily.   All other systems reviewed and are negative.      Physical Exam     Initial Vitals [12/12/17 2027]   BP Pulse Resp Temp SpO2   132/80 (!) 116 20 98 °F (36.7 °C) 99 %      MAP       97.33         Physical Exam    Nursing note and vitals reviewed.  Constitutional: She appears well-developed and well-nourished. No  distress.   HENT:   Head: Normocephalic.   Right Ear: Tympanic membrane normal.   Left Ear: Tympanic membrane normal.   Mouth/Throat: Uvula is midline and mucous membranes are normal. No oropharyngeal exudate or posterior oropharyngeal erythema.   Eyes: Conjunctivae are normal.   Neck: Normal range of motion. Neck supple.   Cardiovascular: Normal rate.   Pulmonary/Chest: Breath sounds normal. No respiratory distress.   Abdominal: Soft. Bowel sounds are normal. She exhibits no distension and no abdominal bruit. There is no tenderness. There is no rebound and no guarding. No hernia.   Neurological: She is alert and oriented to person, place, and time. She has normal strength. No cranial nerve deficit or sensory deficit. GCS eye subscore is 4. GCS verbal subscore is 5. GCS motor subscore is 6.   Skin: Skin is warm and dry.   Psychiatric: She has a normal mood and affect. Her behavior is normal. Judgment and thought content normal.         ED Course   Procedures  Labs Reviewed   THROAT SCREEN, RAPID   CULTURE, STREP A,  THROAT             Medical Decision Making:   Initial Assessment:   42-year-old female presents to emergency room complaining of facial pain and headache and sore throat for the past one week.  Patient states she was recently diagnosed with Bell's palsy approximately 2 weeks ago and began to experience facial pain as the symptoms from Bell's palsy resolved.  Patient states yesterday she began to have a sore throat and runny nose.  Has not taken any medications for symptoms.  Patient denies any fever.  Denies any cough.  Differential Diagnosis:   Viral syndrome, postherpetic neuralgia, migraine, CVA, strep throat, influenza  Clinical Tests:   Lab Tests: Ordered and Reviewed  ED Management:  I will discharge patient with Neurontin and steroids for symptoms.  Patient instructed to rest and hydrate well.  Follow-up primary care doctor.                   ED Course      Clinical Impression:   The primary  encounter diagnosis was Postherpetic neuralgia. Diagnoses of Neuralgia and Sore throat (viral) were also pertinent to this visit.                           Raquel Dick NP  12/12/17 5685

## 2017-12-16 LAB — BACTERIA THROAT CULT: NORMAL

## 2018-05-03 ENCOUNTER — HOSPITAL ENCOUNTER (EMERGENCY)
Facility: HOSPITAL | Age: 43
Discharge: HOME | End: 2018-05-03
Payer: COMMERCIAL

## 2018-05-03 VITALS
OXYGEN SATURATION: 98 % | DIASTOLIC BLOOD PRESSURE: 86 MMHG | HEART RATE: 92 BPM | SYSTOLIC BLOOD PRESSURE: 140 MMHG | BODY MASS INDEX: 33.28 KG/M2 | RESPIRATION RATE: 18 BRPM | TEMPERATURE: 100 F | WEIGHT: 200 LBS

## 2018-05-03 PROCEDURE — 99283 EMERGENCY DEPT VISIT LOW MDM: CPT

## 2018-05-03 PROCEDURE — 25000003 PHARM REV CODE 250: Performed by: NURSE PRACTITIONER

## 2018-05-03 RX ORDER — ONDANSETRON 4 MG/1
4 TABLET, FILM COATED ORAL EVERY 8 HOURS PRN
Qty: 12 TABLET | Refills: 0 | OUTPATIENT
Start: 2018-05-03 | End: 2020-04-01

## 2018-05-03 RX ORDER — ONDANSETRON 4 MG/1
4 TABLET, ORALLY DISINTEGRATING ORAL
Status: COMPLETED | OUTPATIENT
Start: 2018-05-03 | End: 2018-05-03

## 2018-05-03 RX ADMIN — ONDANSETRON 4 MG: 4 TABLET, ORALLY DISINTEGRATING ORAL at 01:05

## 2018-05-03 NOTE — ED PROVIDER NOTES
New Prescriptions    ONDANSETRON (ZOFRAN) 4 MG TABLET    Take 1 tablet (4 mg total) by mouth every 8 (eight) hours as needed for Nausea.    eMERGENCY dEPARTMENT eNCOUnter    CHIEF COMPLAINT    Chief Complaint   Patient presents with    Vomiting     was eating a subway sandwich and got tingling sensation on her tongue. than she became nausated and started vomiting       HPI    Aaliyah Romo is a 42 y.o. female who presents to the ED while eating a subway Tuna sandwich, tongue felt tingly, then started vomiting. Feels very nauseated and has vomited 5 times since 10 a.m. She denies abdominal pain or diarrhea. She denies fever.      CURRENT MEDICATIONS    No current facility-administered medications on file prior to encounter.      Current Outpatient Prescriptions on File Prior to Encounter   Medication Sig Dispense Refill    alprazolam (XANAX) 0.5 MG tablet Take 1 tablet (0.5 mg total) by mouth 2 (two) times daily as needed for Anxiety. 30 tablet 0    gabapentin (NEURONTIN) 300 MG capsule Take 1 capsule (300 mg total) by mouth 3 (three) times daily. 90 capsule 0    hydrochlorothiazide (HYDRODIURIL) 12.5 MG Tab Take 12.5 mg by mouth once daily.      ibuprofen (ADVIL,MOTRIN) 600 MG tablet Take 1 tablet (600 mg total) by mouth every 6 (six) hours as needed for Pain. 20 tablet 0    naproxen (NAPROSYN) 500 MG tablet Take 1 tablet (500 mg total) by mouth 2 (two) times daily with meals. 60 tablet 0    phentermine (ADIPEX-P) 37.5 mg tablet Take 37.5 mg by mouth before breakfast.           ALLERGIES    Review of patient's allergies indicates:   Allergen Reactions    Vancomycin analogues Swelling       PAST MEDICAL HISTORY  Past Medical History:   Diagnosis Date    Anxiety     Bell's palsy     Hypertension        SURGICAL HISTORY    Past Surgical History:   Procedure Laterality Date    BREAST SURGERY      CHOLECYSTECTOMY      COLONOSCOPY      TUBAL LIGATION         SOCIAL HISTORY    Social History      Social History    Marital status: Single     Spouse name: N/A    Number of children: N/A    Years of education: N/A     Social History Main Topics    Smoking status: Never Smoker    Smokeless tobacco: Never Used    Alcohol use No    Drug use: No    Sexual activity: Not on file     Other Topics Concern    Not on file     Social History Narrative    No narrative on file       FAMILY HISTORY    No family history on file.    REVIEW OF SYSTEMS   ROS  Constitutional:  No fever, chills, weight loss or weakness.   Eyes:  No  Photophobia, blurred vision or discharge.   HENT:  No ear pain, nasal congestion or sore throat..  Respiratory:  No cough, shortness of breath or wheezing.   Cardiovascular:  No chest pain, palpitations or swelling.   GI:  No abdominal pain, reports nausea and vomiting, no diarrhea.  : No dysuria, frequency   Musculoskeletal:  No back pain or neck pain.   Skin:  No reported rashes or infected lesions.   Neurologic:  No reported headache.  All Systems otherwise negative except as noted in the History of Present Illness.        PHYSICAL EXAM    Reviewed Triage Note  VITAL SIGNS: BP (!) 140/86 (BP Location: Right arm, Patient Position: Sitting)   Pulse 92   Temp 99.5 °F (37.5 °C) (Oral)   Resp 18   Wt 90.7 kg (200 lb)   SpO2 98%   BMI 33.28 kg/m²    Vitals:    05/03/18 1314   BP: (!) 140/86   Pulse: 92   Resp: 18   Temp: 99.5 °F (37.5 °C)       Physical Exam  Nursing Notes and Vital Signs Reviewed  Constitutional:  Well-developed, well-nourished, middle aged female in NAD.  HENT:  Normocephalic, atraumatic. Bilateral external EACs normal, Bilateral TMs normal. Nose normal, no nasal mucosal edema, no rhinorrhea. Mouth mucus membranes P & M, no oral lesions. Oropharynx no erythema, edema or exudate, uvula midline.   Eyes:  PERRL EOMI. Conjunctiva normal without discharge.   Neck: Normal range of motion. No midline tenderness or vertebral step-off. No stridor. No meningismus. No  lymphadenopathy.   Respiratory:  Normal breath sounds bilaterally.  No respiratory distress, retractions, or conversational dyspnea. No wheezing. No rhonchi. No rales.   Cardiovascular:  Normal heart rate. Normal rhythm. No pitting lower extremity edema.   GI:  Abdomen soft, non-distended, tender LUQ &LLQ. Normal bowel sounds. No guarding, rigidity or rebound.    : No CVA tenderness.   Integument:  Warm and dry. No rash. No petechiae  Neurologic:   Alert and Interactive. MAEW. Gait steady.  Psychiatric:  Affect normal. Mood normal.         LABS  Pertinent labs reviewed. (See chart for details)           RADIOLOGY    Imaging Results    None         PROCEDURES    Procedures      EKG         ED COURSE & MEDICAL DECISION MAKING    Pertinent & Imaging studies reviewed. (See chart for details and specific orders.)  NO fever, abdominal pain. Zofran given in ED. Tolerated powerade without vomiting. Zorfran Rx. Clear liquids today. F/u PCP tomorrow. Return if worsening or concerns.    Medications   ondansetron disintegrating tablet 4 mg (4 mg Oral Given 5/3/18 1340)           FINAL IMPRESSION    1. Food poisoning, accidental or unintentional, initial encounter        Differential Diagnosis: Appendicitis                                       Cholecystitis              viral gastroenteritis      Patient advised to follow-up with PCP for re-check and BP re-check.                   Kanchan Cabral NP  05/03/18 1694

## 2018-06-27 ENCOUNTER — HOSPITAL ENCOUNTER (EMERGENCY)
Facility: HOSPITAL | Age: 43
Discharge: HOME | End: 2018-06-27
Attending: SURGERY
Payer: COMMERCIAL

## 2018-06-27 VITALS
HEART RATE: 88 BPM | RESPIRATION RATE: 17 BRPM | DIASTOLIC BLOOD PRESSURE: 80 MMHG | TEMPERATURE: 99 F | BODY MASS INDEX: 31.98 KG/M2 | HEIGHT: 66 IN | SYSTOLIC BLOOD PRESSURE: 140 MMHG | OXYGEN SATURATION: 99 % | WEIGHT: 199 LBS

## 2018-06-27 DIAGNOSIS — K59.00 CONSTIPATION: ICD-10-CM

## 2018-06-27 DIAGNOSIS — K64.9 HEMORRHOIDS, UNSPECIFIED HEMORRHOID TYPE: Primary | ICD-10-CM

## 2018-06-27 LAB
B-HCG UR QL: NEGATIVE
OB PNL STL: NEGATIVE

## 2018-06-27 PROCEDURE — 82272 OCCULT BLD FECES 1-3 TESTS: CPT

## 2018-06-27 PROCEDURE — 99284 EMERGENCY DEPT VISIT MOD MDM: CPT | Mod: 25

## 2018-06-27 PROCEDURE — 81025 URINE PREGNANCY TEST: CPT

## 2018-06-27 RX ORDER — HYDROCORTISONE 25 MG/G
CREAM TOPICAL 2 TIMES DAILY
Qty: 1 TUBE | Refills: 0 | Status: SHIPPED | OUTPATIENT
Start: 2018-06-27 | End: 2018-07-07

## 2018-06-27 NOTE — ED PROVIDER NOTES
Encounter Date: 6/27/2018       History     Chief Complaint   Patient presents with    Rectal Bleeding     pt states she has been having issues using the restroom, not going enough. last bowel was today and stool was loose. states has had bright red blood from her rectum for the last 4 days. hx of hemorrhoids.      42-year-old female presents to emergency department for evaluation of 4 day history of bright red rectal bleeding.  She states that she has delt with constipation on and off for several years, for which she takes stool softeners.  She states that she takes Dulcolax for symptom control. .  She reports noticing bright red blood every time she has a bowel movement for the last 4 days.  She states that today she started to have a few episodes of loose stool.  She denies fever, chills, body aches, dizziness, lightheadedness, chest pain, abdominal pain, flank pain or dysuria.  She states that she had a few hemorrhoids during her pregnancy several years ago, but has not had a problem since then.  No treatment was attempted prior to arrival.           Review of patient's allergies indicates:   Allergen Reactions    Vancomycin analogues Swelling     Past Medical History:   Diagnosis Date    Anxiety     Bell's palsy     Hypertension      Past Surgical History:   Procedure Laterality Date    BREAST SURGERY      CHOLECYSTECTOMY      COLONOSCOPY      TUBAL LIGATION       History reviewed. No pertinent family history.  Social History   Substance Use Topics    Smoking status: Never Smoker    Smokeless tobacco: Never Used    Alcohol use No     Review of Systems   Constitutional: Negative for activity change, appetite change and fever.   HENT: Negative for congestion, ear pain, facial swelling, postnasal drip, rhinorrhea, sinus pressure, sore throat, trouble swallowing and voice change.    Eyes: Negative for visual disturbance.   Respiratory: Negative for cough, chest tightness and shortness of breath.     Cardiovascular: Negative for chest pain.   Gastrointestinal: Positive for anal bleeding. Negative for abdominal pain, blood in stool, constipation, diarrhea, nausea and vomiting.   Genitourinary: Negative for decreased urine volume, dysuria and hematuria.   Musculoskeletal: Negative for back pain, joint swelling and neck pain.   Neurological: Negative for dizziness, syncope, weakness, numbness and headaches.       Physical Exam     Initial Vitals [06/27/18 1641]   BP Pulse Resp Temp SpO2   (!) 146/82 95 16 98.7 °F (37.1 °C) 98 %      MAP       --         Physical Exam    Nursing note and vitals reviewed.  Constitutional: She appears well-developed and well-nourished. She is not diaphoretic. No distress.   HENT:   Head: Normocephalic and atraumatic.   Right Ear: External ear normal.   Left Ear: External ear normal.   Nose: Nose normal.   Mouth/Throat: Oropharynx is clear and moist.   Eyes: Conjunctivae and EOM are normal. Pupils are equal, round, and reactive to light.   Neck: Normal range of motion. Neck supple.   Cardiovascular: Normal rate, regular rhythm and normal heart sounds.   Pulmonary/Chest: Breath sounds normal. No respiratory distress. She has no wheezes. She has no rhonchi. She has no rales. She exhibits no tenderness.   Abdominal: Soft. Bowel sounds are normal. There is no tenderness.   Genitourinary:   Genitourinary Comments: 3 small  external hemorrhoids noted.  No bleeding noted.  No evidence of thrombosis noted. fissure noted.  No surrounding erythema, edema or tenderness to palpation noted over the anus.  No internal hemorrhoids able to be palpated on exam.   Musculoskeletal: Normal range of motion.   Lymphadenopathy:     She has no cervical adenopathy.   Neurological: She is alert and oriented to person, place, and time.   Skin: Skin is warm and dry.   Psychiatric: She has a normal mood and affect.         ED Course   Procedures  Labs Reviewed - No data to display       Imaging Results    None           Medical Decision Making:   Initial Assessment:   42-year-old female presents  for evaluation of 4 day history of rectal bleeding.  Exam reveals a nontoxic-appearing female in no acute distress.  Patient is afebrile vital signs within normal limits.  Patient is alert and oriented throughout the exam.  Abdominal exam reveals a soft abdomen, nontender to palpation.  No CVA tenderness noted.  Rectal exam reveals 3 small  external hemorrhoids noted.  No bleeding noted.  No evidence of thrombosis noted. fissure noted.  No surrounding erythema, edema or tenderness to palpation noted over the anus.  No impaction able to be palpated.   Differential Diagnosis:   Hemorrhoids  X-ray order to assess for evidence of obstruction or constipation.  I carefully consider but doubt serious and abdominal etiology including  GI bleed.    ED Management:  Hemoccult is negative.  UPT is negative.  x-ray reveals no evidence of obstructive gas pattern.  Discussed these findings with the patient verbalizes understanding and agreement course of treatment.  I will prescribe Anusol for symptom control.  She is advised to continue taking the Colace as needed for constipation.  She was advised follow-up with  for evaluation of hemorrhoids.  She was instructed to return to the emergency department immediately for any worsening symptoms.                        Clinical Impression:   The primary encounter diagnosis was Hemorrhoids, unspecified hemorrhoid type. A diagnosis of Constipation was also pertinent to this visit.                             Alicia Gregorio PA-C  06/27/18 2020

## 2018-06-28 NOTE — DISCHARGE INSTRUCTIONS
You were prescribed Anusol for symptom control.  You are  advised to continue taking the Colace as needed for intermittent constipation.  You are advised to follow-up with Dr. Diaz for reevaluation and possible removal of hemorrhoids.  You are instructed to return to the emergency department immediately for any new or worsening symptoms.

## 2018-08-02 ENCOUNTER — HOSPITAL ENCOUNTER (EMERGENCY)
Facility: HOSPITAL | Age: 43
Discharge: HOME | End: 2018-08-02
Attending: FAMILY MEDICINE
Payer: COMMERCIAL

## 2018-08-02 VITALS
TEMPERATURE: 98 F | DIASTOLIC BLOOD PRESSURE: 73 MMHG | BODY MASS INDEX: 32.62 KG/M2 | HEIGHT: 66 IN | WEIGHT: 203 LBS | SYSTOLIC BLOOD PRESSURE: 124 MMHG | HEART RATE: 82 BPM | OXYGEN SATURATION: 100 % | RESPIRATION RATE: 16 BRPM

## 2018-08-02 DIAGNOSIS — M79.89 LEG SWELLING: Primary | ICD-10-CM

## 2018-08-02 LAB
ALBUMIN SERPL BCP-MCNC: 3.6 G/DL
ALP SERPL-CCNC: 49 U/L
ALT SERPL W/O P-5'-P-CCNC: 14 U/L
ANION GAP SERPL CALC-SCNC: 5 MMOL/L
AST SERPL-CCNC: 23 U/L
BASOPHILS # BLD AUTO: 0.02 K/UL
BASOPHILS NFR BLD: 0.3 %
BILIRUB SERPL-MCNC: 0.6 MG/DL
BUN SERPL-MCNC: 11 MG/DL
CALCIUM SERPL-MCNC: 8.7 MG/DL
CHLORIDE SERPL-SCNC: 104 MMOL/L
CO2 SERPL-SCNC: 30 MMOL/L
CREAT SERPL-MCNC: 0.84 MG/DL
DIFFERENTIAL METHOD: ABNORMAL
EOSINOPHIL # BLD AUTO: 0.2 K/UL
EOSINOPHIL NFR BLD: 2.3 %
ERYTHROCYTE [DISTWIDTH] IN BLOOD BY AUTOMATED COUNT: 13.5 %
EST. GFR  (AFRICAN AMERICAN): >60 ML/MIN/1.73 M^2
EST. GFR  (NON AFRICAN AMERICAN): >60 ML/MIN/1.73 M^2
GLUCOSE SERPL-MCNC: 105 MG/DL
HCT VFR BLD AUTO: 36 %
HGB BLD-MCNC: 12.4 G/DL
LYMPHOCYTES # BLD AUTO: 2.6 K/UL
LYMPHOCYTES NFR BLD: 37.6 %
MCH RBC QN AUTO: 29.5 PG
MCHC RBC AUTO-ENTMCNC: 34.4 G/DL
MCV RBC AUTO: 86 FL
MONOCYTES # BLD AUTO: 0.4 K/UL
MONOCYTES NFR BLD: 6 %
NEUTROPHILS # BLD AUTO: 3.7 K/UL
NEUTROPHILS NFR BLD: 53.5 %
NT-PROBNP: 141 PG/ML
PLATELET # BLD AUTO: 250 K/UL
PMV BLD AUTO: 11.2 FL
POTASSIUM SERPL-SCNC: 3.9 MMOL/L
PROT SERPL-MCNC: 7.4 G/DL
RBC # BLD AUTO: 4.21 M/UL
SODIUM SERPL-SCNC: 139 MMOL/L
WBC # BLD AUTO: 6.89 K/UL

## 2018-08-02 PROCEDURE — 80053 COMPREHEN METABOLIC PANEL: CPT

## 2018-08-02 PROCEDURE — 93010 ELECTROCARDIOGRAM REPORT: CPT | Mod: ,,, | Performed by: INTERNAL MEDICINE

## 2018-08-02 PROCEDURE — 99284 EMERGENCY DEPT VISIT MOD MDM: CPT | Mod: 25

## 2018-08-02 PROCEDURE — 83880 ASSAY OF NATRIURETIC PEPTIDE: CPT

## 2018-08-02 PROCEDURE — 93005 ELECTROCARDIOGRAM TRACING: CPT

## 2018-08-02 PROCEDURE — 85025 COMPLETE CBC W/AUTO DIFF WBC: CPT

## 2018-08-02 RX ORDER — NAPROXEN 500 MG/1
500 TABLET ORAL 2 TIMES DAILY WITH MEALS
Qty: 20 TABLET | Refills: 0 | Status: SHIPPED | OUTPATIENT
Start: 2018-08-02 | End: 2018-08-08

## 2018-08-02 NOTE — ED PROVIDER NOTES
Encounter Date: 8/2/2018       History     Chief Complaint   Patient presents with    Leg Swelling     bilaterl feet swelling x 1 week; also thinks her face is swollen. pt with intermittent SOB. swelling worse in am and better once walking; c/o pain to feet as well.     Patient is a 42-year-old female who presents with intermittent lower extremity swelling. She reports past medical history significant for anxiety, Bell's palsy and hypertension.  She states bilateral ankle swelling. She states associated shortness of breath.  She feels like her body is retaining fluid.  She denied calf tenderness, palpitations, chest pain, nausea or vomiting. She states this happened in the past she did not follow up because she did not like the with her primary care provider treated her.  She states she is on her feet a lot at work.      The history is provided by the patient.     Review of patient's allergies indicates:   Allergen Reactions    Vancomycin analogues Swelling     Past Medical History:   Diagnosis Date    Anxiety     Bell's palsy     Hypertension      Past Surgical History:   Procedure Laterality Date    BREAST SURGERY      CHOLECYSTECTOMY      COLONOSCOPY      TUBAL LIGATION       History reviewed. No pertinent family history.  Social History   Substance Use Topics    Smoking status: Never Smoker    Smokeless tobacco: Never Used    Alcohol use No     Review of Systems   Constitutional: Negative for activity change, appetite change, chills and fever.   HENT: Negative for congestion, rhinorrhea and sore throat.    Eyes: Negative for redness and visual disturbance.   Respiratory: Negative for cough, chest tightness and shortness of breath.    Cardiovascular: Positive for leg swelling. Negative for chest pain and palpitations.   Gastrointestinal: Negative for abdominal pain, diarrhea, nausea and vomiting.   Genitourinary: Negative for dysuria and frequency.   Musculoskeletal: Negative for back pain, neck pain  and neck stiffness.   Skin: Negative for rash.   Neurological: Negative for dizziness, syncope, numbness and headaches.       Physical Exam     Initial Vitals [08/02/18 1712]   BP Pulse Resp Temp SpO2   (!) 139/93 92 18 98.3 °F (36.8 °C) 100 %      MAP       --         Physical Exam    Constitutional: Vital signs are normal. She appears well-developed and well-nourished. She is cooperative.  Non-toxic appearance. She does not have a sickly appearance.   HENT:   Head: Normocephalic and atraumatic.   Right Ear: Abnromal external ear normal.   Left Ear: Abnormal external ear normal.   Nose: Nose abnormal.   Mouth/Throat: Oropharynx is clear and moist.   Eyes: Conjunctivae and lids are normal. Pupils are equal, round, and reactive to light.   Neck: Normal range of motion and full passive range of motion without pain. Neck supple.   Cardiovascular: Normal rate, regular rhythm and normal heart sounds. Exam reveals no gallop and no friction rub.    No murmur heard.  Pulmonary/Chest: Effort normal and breath sounds normal. She has no decreased breath sounds. She has no wheezes. She has no rhonchi. She has no rales.   Abdominal: Soft. Normal appearance. There is no tenderness. There is no rigidity, no rebound and no guarding.   Musculoskeletal:   No lower extremity swelling. No calf tenderness. Normal pedal pulse bilaterally. Negative homans sign bilaterally.    Neurological: She is alert and oriented to person, place, and time.   Skin: Skin is warm, dry and intact. No rash noted.         ED Course   Procedures  Labs Reviewed   CBC W/ AUTO DIFFERENTIAL - Abnormal; Notable for the following:        Result Value    Hematocrit 36.0 (*)     All other components within normal limits   COMPREHENSIVE METABOLIC PANEL - Abnormal; Notable for the following:     CO2 30 (*)     Anion Gap 5 (*)     All other components within normal limits   NT-PRO NATRIURETIC PEPTIDE     EKG Readings: (Independently Interpreted)   Rhythm: Normal Sinus  Rhythm. Heart Rate: 78. Ectopy: No Ectopy. Conduction: Normal. ST Segments: Normal ST Segments. T Waves: Normal. Clinical Impression: Normal Sinus Rhythm       Imaging Results          X-Ray Chest AP Portable (Final result)  Result time 08/02/18 18:09:41    Final result by Wayne Hines Jr., MD (08/02/18 18:09:41)                 Impression:      No acute findings.      Electronically signed by: Wayne Hines MD  Date:    08/02/2018  Time:    18:09             Narrative:    EXAMINATION:  XR CHEST AP PORTABLE    CLINICAL HISTORY:  leg swelling;    COMPARISON:  09/29/2016    FINDINGS:  Heart size is normal.  Lungs appear clear of active disease.  No infiltrates or effusions.  No suspicious mass. Right nipple jewelry.                                 Medical Decision Making:   Initial Assessment:   Patient is a 42-year-old female who presents with intermittent lower extremity swelling. She reports past medical history significant for anxiety, Bell's palsy and hypertension.  She states bilateral ankle swelling. She states associated shortness of breath.  She feels like her body is retaining fluid.  She denied calf tenderness, palpitations, chest pain, nausea or vomiting. She states this happened in the past she did not follow up because she did not like the with her primary care provider treated her.  She states she is on her feet a lot at work.  Differential Diagnosis:   CHF  DVT    Clinical Tests:   Lab Tests: Ordered and Reviewed  Radiological Study: Reviewed and Ordered  Medical Tests: Ordered and Reviewed  ED Management:  Urgent evaluation of a 42-year-old female who presents with generalized body swelling.  She states she feels like her face in her legs retaining fluid.  She has no lower extremity swelling on exam.  No calf tenderness. Negative Homans sign. Breath sounds are clear equal bilaterally.  EKG shows no acute changes.  She denies chest pain. Labs are unremarkable including a BNP.  Chest x-ray shows  no sign of pulmonary edema.  Patient states this happened to her in the past.  She does not follow up with primary care.  I instructed her to elevate her legs and wear Wilner hose.  She voices understanding.  I have given her list of local primary care providers.  No further workup indicated at this time.  She is safe for discharge and outpatient follow-up.  Return precautions given.                      Clinical Impression:   The encounter diagnosis was Leg swelling.                             Nicole Majano PA-C  08/02/18 [id number]

## 2018-08-02 NOTE — DISCHARGE INSTRUCTIONS
Elevate your legs.  You can get compression stockings to wear when you will be up  You need to establish care with a primary care provider for further management.  For worsening symptoms, chest pain, shortness of breath, increased abdominal pain, high grade fever, stroke or stroke like symptoms, immediately go to the nearest Emergency Room or call 911 as soon as possible.

## 2018-08-08 ENCOUNTER — HOSPITAL ENCOUNTER (EMERGENCY)
Facility: HOSPITAL | Age: 43
Discharge: HOME | End: 2018-08-08
Attending: SURGERY
Payer: COMMERCIAL

## 2018-08-08 VITALS
OXYGEN SATURATION: 99 % | SYSTOLIC BLOOD PRESSURE: 115 MMHG | TEMPERATURE: 99 F | DIASTOLIC BLOOD PRESSURE: 90 MMHG | RESPIRATION RATE: 17 BRPM | HEART RATE: 97 BPM | WEIGHT: 203 LBS | BODY MASS INDEX: 32.77 KG/M2

## 2018-08-08 DIAGNOSIS — R51.9 FRONTAL HEADACHE: Primary | ICD-10-CM

## 2018-08-08 DIAGNOSIS — M79.89 LEG SWELLING: ICD-10-CM

## 2018-08-08 LAB
ALBUMIN SERPL BCP-MCNC: 4 G/DL
ALP SERPL-CCNC: 55 U/L
ALT SERPL W/O P-5'-P-CCNC: 12 U/L
ANION GAP SERPL CALC-SCNC: 6 MMOL/L
AST SERPL-CCNC: 16 U/L
B-HCG UR QL: NEGATIVE
BASOPHILS # BLD AUTO: 0.02 K/UL
BASOPHILS NFR BLD: 0.3 %
BILIRUB SERPL-MCNC: 0.6 MG/DL
BILIRUB UR QL STRIP: NEGATIVE
BUN SERPL-MCNC: 14 MG/DL
CALCIUM SERPL-MCNC: 9.1 MG/DL
CHLORIDE SERPL-SCNC: 106 MMOL/L
CLARITY UR REFRACT.AUTO: CLEAR
CO2 SERPL-SCNC: 27 MMOL/L
COLOR UR AUTO: YELLOW
CREAT SERPL-MCNC: 0.94 MG/DL
DIFFERENTIAL METHOD: NORMAL
EOSINOPHIL # BLD AUTO: 0.1 K/UL
EOSINOPHIL NFR BLD: 1.5 %
ERYTHROCYTE [DISTWIDTH] IN BLOOD BY AUTOMATED COUNT: 13.8 %
EST. GFR  (AFRICAN AMERICAN): >60 ML/MIN/1.73 M^2
EST. GFR  (NON AFRICAN AMERICAN): >60 ML/MIN/1.73 M^2
FLUAV AG SPEC QL IA: NEGATIVE
FLUBV AG SPEC QL IA: NEGATIVE
GLUCOSE SERPL-MCNC: 111 MG/DL
GLUCOSE UR QL STRIP: NEGATIVE
HCT VFR BLD AUTO: 37.6 %
HGB BLD-MCNC: 13 G/DL
HGB UR QL STRIP: ABNORMAL
KETONES UR QL STRIP: NEGATIVE
LEUKOCYTE ESTERASE UR QL STRIP: NEGATIVE
LIPASE SERPL-CCNC: 54 U/L
LYMPHOCYTES # BLD AUTO: 2.5 K/UL
LYMPHOCYTES NFR BLD: 33.9 %
MCH RBC QN AUTO: 29.2 PG
MCHC RBC AUTO-ENTMCNC: 34.6 G/DL
MCV RBC AUTO: 85 FL
MONOCYTES # BLD AUTO: 0.5 K/UL
MONOCYTES NFR BLD: 6.4 %
NEUTROPHILS # BLD AUTO: 4.2 K/UL
NEUTROPHILS NFR BLD: 57.8 %
NITRITE UR QL STRIP: NEGATIVE
NT-PROBNP: 53 PG/ML
PH UR STRIP: 5 [PH] (ref 5–8)
PLATELET # BLD AUTO: 228 K/UL
PMV BLD AUTO: 11.1 FL
POTASSIUM SERPL-SCNC: 3.9 MMOL/L
PROT SERPL-MCNC: 8.1 G/DL
PROT UR QL STRIP: NEGATIVE
RBC # BLD AUTO: 4.45 M/UL
SODIUM SERPL-SCNC: 139 MMOL/L
SP GR UR STRIP: 1.02 (ref 1–1.03)
SPECIMEN SOURCE: NORMAL
URN SPEC COLLECT METH UR: ABNORMAL
UROBILINOGEN UR STRIP-ACNC: NEGATIVE EU/DL
WBC # BLD AUTO: 7.22 K/UL

## 2018-08-08 PROCEDURE — 83880 ASSAY OF NATRIURETIC PEPTIDE: CPT

## 2018-08-08 PROCEDURE — 81003 URINALYSIS AUTO W/O SCOPE: CPT

## 2018-08-08 PROCEDURE — 25000003 PHARM REV CODE 250: Performed by: PHYSICIAN ASSISTANT

## 2018-08-08 PROCEDURE — 85025 COMPLETE CBC W/AUTO DIFF WBC: CPT

## 2018-08-08 PROCEDURE — 87400 INFLUENZA A/B EACH AG IA: CPT

## 2018-08-08 PROCEDURE — 99283 EMERGENCY DEPT VISIT LOW MDM: CPT | Mod: 25

## 2018-08-08 PROCEDURE — 81025 URINE PREGNANCY TEST: CPT

## 2018-08-08 PROCEDURE — 63600175 PHARM REV CODE 636 W HCPCS: Performed by: PHYSICIAN ASSISTANT

## 2018-08-08 PROCEDURE — 80053 COMPREHEN METABOLIC PANEL: CPT

## 2018-08-08 PROCEDURE — 83690 ASSAY OF LIPASE: CPT

## 2018-08-08 PROCEDURE — 96374 THER/PROPH/DIAG INJ IV PUSH: CPT

## 2018-08-08 RX ORDER — ACETAMINOPHEN 500 MG
1000 TABLET ORAL
Status: COMPLETED | OUTPATIENT
Start: 2018-08-08 | End: 2018-08-08

## 2018-08-08 RX ORDER — NAPROXEN 500 MG/1
500 TABLET ORAL 2 TIMES DAILY WITH MEALS
Qty: 12 TABLET | Refills: 0 | OUTPATIENT
Start: 2018-08-08 | End: 2018-08-28

## 2018-08-08 RX ORDER — KETOROLAC TROMETHAMINE 30 MG/ML
10 INJECTION, SOLUTION INTRAMUSCULAR; INTRAVENOUS
Status: COMPLETED | OUTPATIENT
Start: 2018-08-08 | End: 2018-08-08

## 2018-08-08 RX ADMIN — ACETAMINOPHEN 1000 MG: 500 TABLET ORAL at 07:08

## 2018-08-08 RX ADMIN — KETOROLAC TROMETHAMINE 10 MG: 30 INJECTION, SOLUTION INTRAMUSCULAR at 07:08

## 2018-08-08 NOTE — ED NOTES
Pt in through triage with multiple complaints. States has been feeling dizzy, nauseated, and has some generalized swelling to bilat legs. States she works on her feet. States 3 days ago she hit her head on a clothes rack- denies any loc. No open wounds. Pt denies any cough, cold, congestion, or fever.

## 2018-08-08 NOTE — ED PROVIDER NOTES
Encounter Date: 8/8/2018       History     Chief Complaint   Patient presents with    Headache     I hit my head 3 days ago on a clothing rack when i bent down when in the store. I have been having a headache since then. My forhead hurts.      42-year-old female presents emergency department for evaluation of 1 week history of generalized malaise, bilateral lower leg swelling, chills and and 5 day history of headache status post trauma. She states that the generalized malaise, body aches and chills started gradually 5 days ago and have been constant since onset.  She began noticing that she has swelling to her lower extremities concentrated in her ankle but extending up into her calves bilaterally. She reports that the swelling is worse with standing and walking.  She also reports noticing swelling to her face.  She denies any swelling to her upper extremities. She states that 5 days ago she was feeling run down and bent to get something off the floor when she hit her forehead on a rack while she was at a store.  She reports that she got a very large bruise on her forehead the that began as soon as she hit her head and it has been constant since onset.  She states that since the injury she has had a constant, throbbing, 6/10 frontal  headache.  She denies any loss of consciousness or vision changes.  She reports that this morning she vomited but she does not think that it was due to the headache. She states that she has been nauseated intermittently with the generalized malaise.   She denies any numbness or tingling to the upper or lower extremities. She has attempted treatment with Tylenol with no relief of symptoms.          Review of patient's allergies indicates:   Allergen Reactions    Vancomycin analogues Swelling     Past Medical History:   Diagnosis Date    Anxiety     Bell's palsy     Hypertension      Past Surgical History:   Procedure Laterality Date    BREAST SURGERY      CHOLECYSTECTOMY       COLONOSCOPY      TUBAL LIGATION       History reviewed. No pertinent family history.  Social History   Substance Use Topics    Smoking status: Never Smoker    Smokeless tobacco: Never Used    Alcohol use No     Review of Systems   Constitutional: Positive for chills. Negative for activity change, appetite change and fever.   HENT: Negative for congestion, ear discharge, ear pain, facial swelling, nosebleeds, rhinorrhea, sinus pain, sinus pressure, sore throat, trouble swallowing and voice change.    Eyes: Negative for photophobia and visual disturbance.   Respiratory: Negative for cough, chest tightness and shortness of breath.    Cardiovascular: Positive for leg swelling. Negative for chest pain.   Gastrointestinal: Positive for nausea and vomiting. Negative for abdominal pain, blood in stool, constipation and diarrhea.   Genitourinary: Negative for decreased urine volume, dysuria and hematuria.   Musculoskeletal: Negative for back pain, gait problem, joint swelling, neck pain and neck stiffness.   Neurological: Positive for headaches. Negative for dizziness, syncope, weakness, light-headedness and numbness.       Physical Exam     Initial Vitals [08/08/18 1731]   BP Pulse Resp Temp SpO2   (!) 117/92 104 20 98.9 °F (37.2 °C) 97 %      MAP       --         Physical Exam    Nursing note and vitals reviewed.  Constitutional: She appears well-developed and well-nourished. She is not diaphoretic. No distress.   HENT:   Head: Normocephalic and atraumatic. Head is without raccoon's eyes, without Perrin's sign, without abrasion, without contusion and without laceration.       Right Ear: Tympanic membrane, external ear and ear canal normal. Tympanic membrane is not perforated.   Left Ear: Tympanic membrane, external ear and ear canal normal. Tympanic membrane is not perforated.   Nose: Nose normal. No sinus tenderness.   Mouth/Throat: Uvula is midline, oropharynx is clear and moist and mucous membranes are normal. No  dental abscesses, uvula swelling or dental caries.   Eyes: Conjunctivae and EOM are normal. Pupils are equal, round, and reactive to light.   Neck: Normal range of motion. Neck supple.   Cardiovascular: Normal rate, regular rhythm and normal heart sounds. Exam reveals no gallop and no friction rub.    No murmur heard.  Pulmonary/Chest: Breath sounds normal. No respiratory distress. She has no wheezes. She has no rhonchi. She has no rales. She exhibits no tenderness.   Abdominal: Soft. Bowel sounds are normal. She exhibits no distension. There is no tenderness. There is no rebound.   Musculoskeletal:        Right hip: She exhibits normal range of motion and no tenderness.        Left hip: She exhibits normal range of motion and no tenderness.        Right knee: She exhibits normal range of motion. No tenderness found.        Left knee: She exhibits normal range of motion. No tenderness found.        Right ankle: She exhibits normal range of motion and no swelling. No tenderness.        Left ankle: She exhibits normal range of motion and no swelling. No tenderness.        Right upper leg: She exhibits no swelling.        Left upper leg: She exhibits no swelling.        Right lower leg: She exhibits no swelling.        Left lower leg: She exhibits no swelling.        Right foot: There is no swelling.        Left foot: There is no swelling.   Lymphadenopathy:     She has no cervical adenopathy.   Neurological: She is alert and oriented to person, place, and time. She has normal strength. No cranial nerve deficit or sensory deficit. She displays a negative Romberg sign. Coordination and gait normal. GCS eye subscore is 4. GCS verbal subscore is 5. GCS motor subscore is 6.   Skin: Skin is warm and dry.   Psychiatric: She has a normal mood and affect.         ED Course   Procedures  Labs Reviewed   CBC W/ AUTO DIFFERENTIAL   COMPREHENSIVE METABOLIC PANEL   B-TYPE NATRIURETIC PEPTIDE   PREGNANCY TEST, URINE RAPID    URINALYSIS, REFLEX TO URINE CULTURE   LIPASE   INFLUENZA A AND B ANTIGEN          Imaging Results    None          Medical Decision Making:   Initial Assessment:   42-year-old female presents for evaluation of 1 week history of generalized malaise, bilateral lower leg swelling, facial swelling and mild headache status post trauma. Physical exam reveals a nontoxic-appearing female in no acute distress.  Patient is afebrile vital signs within normal limits.  Neurological exam reveals an alert and oriented patient.  No cranial nerve deficits noted.  No evidence of head injury noted. No Perrin signs or raccoon eyes noted. No hemotympanum noted.No facial swelling noted. No contusion noted over the forehead.  No bony instability or crepitus noted over the forehead.  No tenderness to palpation noted of the paraspinal musculature of the spinous processes of the cervical, thoracic or lumbar spine.  Lungs clear to auscultation bilaterally.  No respiratory distress or accessory muscle use noted. Abdominal exam reveals a soft abdomen, nontender to palpation. No CVA tenderness noted.  Examination of the lower extremities reveals no erythema, edema or ecchymosis noted. Full range of motion, sensation and peripheral pulses intact in upper and lower extremities bilaterally. Patient ambulates well without hesitation or gait abnormality.  Differential Diagnosis:   I carefully considered but doubt serious intracranial etiology including skull fracture or hemorrhage. No CT imaging is indicated at this time.  Repeat lab work ordered.  Urinalysis ordered.  No repeat chest x-ray indicated at this time.  I reviewed the chest x-ray from 8/2/18.  ED Management:  UPT negative. Patient given Tylenol and Toradol with complete relief of symptoms.  Urinalysis reveals no evidence of UTI.  CBC reveals no acute leukocytosis or anemia.  CMP results within normal limits. Lipase 54.  Influenza negative. BMP 53.  I discussed these findings at length  with the patient verbalizes understanding and agreement course of treatment.  Instructed the patient to follow up with her primary care provider for re-evaluation within 3 days.  Instructed the patient to return to the emergency department immediately for any new or worsening symptoms. Dr. Wagner evaluated this patient and is in agreement with the course of treatment.                      Clinical Impression:   The primary encounter diagnosis was Frontal headache. A diagnosis of Leg swelling was also pertinent to this visit.                             Alicia Gregorio PA-C  08/08/18 0433

## 2018-08-09 NOTE — DISCHARGE INSTRUCTIONS
You are instructed to follow up with their primary care provider for re-evaluation within 3 days.  You are instructed to return to the emergency department immediately for any new or worsening symptoms. Your instructed to return to the emergency department immediately for any new or worsening symptoms.

## 2018-08-28 ENCOUNTER — HOSPITAL ENCOUNTER (EMERGENCY)
Facility: HOSPITAL | Age: 43
Discharge: HOME | End: 2018-08-28
Attending: FAMILY MEDICINE
Payer: COMMERCIAL

## 2018-08-28 VITALS
TEMPERATURE: 99 F | RESPIRATION RATE: 20 BRPM | OXYGEN SATURATION: 100 % | SYSTOLIC BLOOD PRESSURE: 127 MMHG | HEIGHT: 66 IN | WEIGHT: 203 LBS | BODY MASS INDEX: 32.62 KG/M2 | HEART RATE: 81 BPM | DIASTOLIC BLOOD PRESSURE: 89 MMHG

## 2018-08-28 DIAGNOSIS — S63.502A WRIST SPRAIN, LEFT, INITIAL ENCOUNTER: ICD-10-CM
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
## 2018-08-28 DIAGNOSIS — S43.401A SPRAIN OF RIGHT SHOULDER, UNSPECIFIED SHOULDER SPRAIN TYPE, INITIAL ENCOUNTER: ICD-10-CM

## 2018-08-28 DIAGNOSIS — I10 ESSENTIAL HYPERTENSION: ICD-10-CM

## 2018-08-28 DIAGNOSIS — S33.5XXA LUMBAR SPRAIN, INITIAL ENCOUNTER: Primary | ICD-10-CM

## 2018-08-28 DIAGNOSIS — S73.101A HIP SPRAIN, RIGHT, INITIAL ENCOUNTER: ICD-10-CM

## 2018-08-28 LAB — B-HCG UR QL: NEGATIVE

## 2018-08-28 PROCEDURE — 81025 URINE PREGNANCY TEST: CPT

## 2018-08-28 PROCEDURE — 96372 THER/PROPH/DIAG INJ SC/IM: CPT

## 2018-08-28 PROCEDURE — 25000003 PHARM REV CODE 250: Performed by: PHYSICIAN ASSISTANT

## 2018-08-28 PROCEDURE — 63600175 PHARM REV CODE 636 W HCPCS: Performed by: PHYSICIAN ASSISTANT

## 2018-08-28 PROCEDURE — 99284 EMERGENCY DEPT VISIT MOD MDM: CPT | Mod: 25

## 2018-08-28 RX ORDER — ORPHENADRINE CITRATE 100 MG/1
100 TABLET, EXTENDED RELEASE ORAL 2 TIMES DAILY
Qty: 20 TABLET | Refills: 0 | Status: SHIPPED | OUTPATIENT
Start: 2018-08-28 | End: 2018-09-07

## 2018-08-28 RX ORDER — KETOROLAC TROMETHAMINE 30 MG/ML
60 INJECTION, SOLUTION INTRAMUSCULAR; INTRAVENOUS
Status: COMPLETED | OUTPATIENT
Start: 2018-08-28 | End: 2018-08-28

## 2018-08-28 RX ORDER — HYDROCODONE BITARTRATE AND ACETAMINOPHEN 10; 325 MG/1; MG/1
1 TABLET ORAL EVERY 4 HOURS PRN
Qty: 18 TABLET | Refills: 0 | OUTPATIENT
Start: 2018-08-28 | End: 2020-04-01

## 2018-08-28 RX ORDER — HYDROCODONE BITARTRATE AND ACETAMINOPHEN 10; 325 MG/1; MG/1
1 TABLET ORAL
Status: COMPLETED | OUTPATIENT
Start: 2018-08-28 | End: 2018-08-28

## 2018-08-28 RX ORDER — HYDROCHLOROTHIAZIDE 25 MG/1
25 TABLET ORAL
Status: DISCONTINUED | OUTPATIENT
Start: 2018-08-28 | End: 2018-08-28 | Stop reason: HOSPADM

## 2018-08-28 RX ORDER — NAPROXEN 500 MG/1
500 TABLET ORAL 2 TIMES DAILY WITH MEALS
Qty: 60 TABLET | Refills: 0 | OUTPATIENT
Start: 2018-08-28 | End: 2020-04-01

## 2018-08-28 RX ORDER — ORPHENADRINE CITRATE 30 MG/ML
60 INJECTION INTRAMUSCULAR; INTRAVENOUS
Status: COMPLETED | OUTPATIENT
Start: 2018-08-28 | End: 2018-08-28

## 2018-08-28 RX ORDER — LISINOPRIL 10 MG/1
10 TABLET ORAL DAILY
Qty: 30 TABLET | Refills: 0 | Status: SHIPPED | OUTPATIENT
Start: 2018-08-28 | End: 2022-06-09

## 2018-08-28 RX ORDER — LISINOPRIL 5 MG/1
5 TABLET ORAL
Status: DISCONTINUED | OUTPATIENT
Start: 2018-08-28 | End: 2018-08-28 | Stop reason: HOSPADM

## 2018-08-28 RX ADMIN — ORPHENADRINE CITRATE 60 MG: 30 INJECTION INTRAMUSCULAR; INTRAVENOUS at 01:08

## 2018-08-28 RX ADMIN — HYDROCODONE BITARTRATE AND ACETAMINOPHEN 1 TABLET: 10; 325 TABLET ORAL at 03:08

## 2018-08-28 RX ADMIN — KETOROLAC TROMETHAMINE 60 MG: 30 INJECTION, SOLUTION INTRAMUSCULAR at 01:08
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
## 2019-10-15 DIAGNOSIS — Z12.31 VISIT FOR SCREENING MAMMOGRAM: Primary | ICD-10-CM

## 2019-11-18 ENCOUNTER — HOSPITAL ENCOUNTER (OUTPATIENT)
Dept: RADIOLOGY | Facility: HOSPITAL | Age: 44
Discharge: HOME | End: 2019-11-18
Attending: SPECIALIST
Payer: COMMERCIAL

## 2019-11-18 DIAGNOSIS — Z12.31 VISIT FOR SCREENING MAMMOGRAM: ICD-10-CM

## 2019-11-18 PROCEDURE — 77063 MAMMO DIGITAL SCREENING BILAT WITH TOMOSYNTHESIS_CAD: ICD-10-PCS | Mod: 26,,, | Performed by: RADIOLOGY

## 2019-11-18 PROCEDURE — 77067 MAMMO DIGITAL SCREENING BILAT WITH TOMOSYNTHESIS_CAD: ICD-10-PCS | Mod: 26,,, | Performed by: RADIOLOGY

## 2019-11-18 PROCEDURE — 77063 BREAST TOMOSYNTHESIS BI: CPT | Mod: 26,,, | Performed by: RADIOLOGY

## 2019-11-18 PROCEDURE — 77067 SCR MAMMO BI INCL CAD: CPT | Mod: 26,,, | Performed by: RADIOLOGY

## 2019-11-18 PROCEDURE — 77067 SCR MAMMO BI INCL CAD: CPT | Mod: TC

## 2019-11-25 ENCOUNTER — TELEPHONE (OUTPATIENT)
Dept: OBSTETRICS AND GYNECOLOGY | Facility: CLINIC | Age: 44
End: 2019-11-25

## 2019-11-25 NOTE — TELEPHONE ENCOUNTER
Called patient, no answer, left message informing patient I see she had an appointment on 11/25/2019 and was a no show. I was calling to try to reschedule patient's appointment she missed on 11/25/2019.

## 2019-12-30 ENCOUNTER — HOSPITAL ENCOUNTER (EMERGENCY)
Facility: HOSPITAL | Age: 44
Discharge: HOME | End: 2019-12-31
Attending: EMERGENCY MEDICINE
Payer: COMMERCIAL

## 2019-12-30 VITALS
WEIGHT: 185 LBS | TEMPERATURE: 98 F | SYSTOLIC BLOOD PRESSURE: 141 MMHG | RESPIRATION RATE: 18 BRPM | DIASTOLIC BLOOD PRESSURE: 73 MMHG | OXYGEN SATURATION: 98 % | HEIGHT: 65 IN | BODY MASS INDEX: 30.82 KG/M2 | HEART RATE: 92 BPM

## 2019-12-30 DIAGNOSIS — S93.491A SPRAIN OF OTHER LIGAMENT OF RIGHT ANKLE, INITIAL ENCOUNTER: ICD-10-CM

## 2019-12-30 DIAGNOSIS — S39.012A LUMBAR STRAIN, INITIAL ENCOUNTER: ICD-10-CM

## 2019-12-30 DIAGNOSIS — S76.011A HIP STRAIN, RIGHT, INITIAL ENCOUNTER: Primary | ICD-10-CM
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
## 2019-12-30 PROCEDURE — 25000003 PHARM REV CODE 250: Mod: ER | Performed by: EMERGENCY MEDICINE

## 2019-12-30 PROCEDURE — 99284 EMERGENCY DEPT VISIT MOD MDM: CPT | Mod: 25,ER

## 2019-12-30 RX ORDER — TRAMADOL HYDROCHLORIDE 50 MG/1
50 TABLET ORAL EVERY 6 HOURS PRN
Qty: 15 TABLET | Refills: 0 | OUTPATIENT
Start: 2019-12-30 | End: 2020-04-01

## 2019-12-30 RX ORDER — IBUPROFEN 400 MG/1
800 TABLET ORAL
Status: COMPLETED | OUTPATIENT
Start: 2019-12-30 | End: 2019-12-30

## 2019-12-30 RX ADMIN — IBUPROFEN 800 MG: 400 TABLET, FILM COATED ORAL at 11:12

## 2019-12-31 RX ORDER — IBUPROFEN 800 MG/1
800 TABLET ORAL EVERY 6 HOURS PRN
Qty: 20 TABLET | Refills: 0 | Status: SHIPPED | OUTPATIENT
Start: 2019-12-30 | End: 2020-04-01

## 2019-12-31 RX ORDER — CYCLOBENZAPRINE HCL 10 MG
10 TABLET ORAL 3 TIMES DAILY PRN
Qty: 15 TABLET | Refills: 0 | Status: SHIPPED | OUTPATIENT
Start: 2019-12-31 | End: 2020-01-05

## 2019-12-31 NOTE — ED NOTES
Dr Jaimes and myself at the bedside providing discharge instructions and rx.  Reviewed on follow up, when to return to ER, and when to seek further specialty treatment.  Pt verbalized understanding.  Pt ambulatory at discharge in stable condition

## 2019-12-31 NOTE — ED NOTES
Reports to ED c c/o of fall. States at around 1900 she was at [employer] and slipped in water and got her foot caught in the basket. ROSA. Pulses intact. Cap refill < 3 secs. No swelling, redness, or bruising noted.

## 2019-12-31 NOTE — ED PROVIDER NOTES
Encounter Date: 12/30/2019       History     Chief Complaint   Patient presents with    Fall     Patient reports she slipped and fell at [employer] d/t water being on the floor, she reports she landed on her right side, right ankle, right hip pain, right leg pain, lower back and upper back.      44-year-old female presents complaining of slipping and falling at [employer] is in water being on the floor.  Patient reports landing on her right side right ankle and right hip.  Patient is also complaining of lower back pain. Patient denies head injury or loss of consciousness.  Pain is rated 7/10.        Review of patient's allergies indicates:   Allergen Reactions    Vancomycin analogues Swelling     Past Medical History:   Diagnosis Date    Anxiety     Bell's palsy     Hypertension      Past Surgical History:   Procedure Laterality Date    BREAST SURGERY      CHOLECYSTECTOMY      COLONOSCOPY      TOTAL REDUCTION MAMMOPLASTY Bilateral 2007    TUBAL LIGATION       Family History   Problem Relation Age of Onset    Liver cancer Father      Social History     Tobacco Use    Smoking status: Never Smoker    Smokeless tobacco: Never Used   Substance Use Topics    Alcohol use: No    Drug use: No     Review of Systems   Musculoskeletal: Positive for arthralgias, back pain and gait problem. Negative for neck pain.   All other systems reviewed and are negative.      Physical Exam     Initial Vitals [12/30/19 2259]   BP Pulse Resp Temp SpO2   (!) 141/73 92 18 98.3 °F (36.8 °C) 98 %      MAP       --         Physical Exam    Nursing note and vitals reviewed.  Constitutional: She appears well-developed and well-nourished. She is not diaphoretic. No distress.   HENT:   Head: Normocephalic and atraumatic.   Mouth/Throat: Oropharynx is clear and moist.   Eyes: Conjunctivae and EOM are normal. Pupils are equal, round, and reactive to light.   Neck: Normal range of motion. Neck supple. No JVD present.   Cardiovascular: Normal rate,  regular rhythm, normal heart sounds and intact distal pulses. Exam reveals no gallop and no friction rub.    No murmur heard.  Pulmonary/Chest: Breath sounds normal. No respiratory distress. She has no wheezes. She has no rhonchi. She has no rales.   Abdominal: Soft. Bowel sounds are normal. She exhibits no distension and no mass. There is no tenderness. There is no rebound and no guarding.   Musculoskeletal: Normal range of motion. She exhibits tenderness. She exhibits no edema.   Positive lumbar sacral tenderness to palpation. Positive right hip tenderness to palpation and with active range of motion of right lower extremity.  Positive tenderness to right ankle and with flexion and extension of right foot.  No joint swelling of ankle noted   Lymphadenopathy:     She has no cervical adenopathy.   Neurological: She is alert and oriented to person, place, and time. GCS score is 15. GCS eye subscore is 4. GCS verbal subscore is 5. GCS motor subscore is 6.   Skin: Skin is warm. Capillary refill takes less than 2 seconds. No rash noted. No erythema.         ED Course   Procedures  Labs Reviewed - No data to display       Imaging Results          X-Ray Ankle Complete Right (Final result)  Result time 12/30/19 23:55:44    Final result by Jeff Baez MD (12/30/19 23:55:44)                 Impression:      See above.      Electronically signed by: Jeff Baez  Date:    12/30/2019  Time:    23:55             Narrative:    EXAMINATION:  XR ANKLE COMPLETE 3 VIEW RIGHT    CLINICAL HISTORY:  Unspecified fall, initial encounter    TECHNIQUE:  Standard radiography performed.    COMPARISON:  None    FINDINGS:  No fracture or dislocation.                               X-Ray Lumbar Spine Ap And Lateral (Final result)  Result time 12/30/19 23:56:18    Final result by Jeff Baez MD (12/30/19 23:56:18)                 Impression:      No acute findings.      Electronically signed by: Jeff Baez  Date:    12/30/2019  Time:    23:56              Narrative:    EXAMINATION:  XR LUMBAR SPINE AP AND LATERAL    CLINICAL HISTORY:  Low back pain, minor trauma;    COMPARISON:  08/28/2018    FINDINGS:  No fracture or subluxation.  Disc spaces are maintained.  Surgical clips in the right upper abdomen.                               X-Ray Hip 2 View Right (Final result)  Result time 12/30/19 23:55:25    Final result by Jeff Baez MD (12/30/19 23:55:25)                 Impression:      No acute findings.      Electronically signed by: Jeff Beaz  Date:    12/30/2019  Time:    23:55             Narrative:    EXAMINATION:  XR HIP 2 VIEW RIGHT    CLINICAL HISTORY:  Unspecified fall, initial encounter    TECHNIQUE:  Standard radiography performed.    COMPARISON:  None    FINDINGS:  No fracture or dislocation.  Vascular calcifications in the pelvis.                                                                 Clinical Impression:       ICD-10-CM ICD-9-CM   1. Hip strain, right, initial encounter S76.011A 843.9   2. Fall W19.XXXA E888.9   3. Lumbar strain, initial encounter S39.012A 847.2   4. Sprain of other ligament of right ankle, initial encounter S93.491A 845.09                             Elgin Jaimes MD  12/31/19 0524

## 2020-04-01 ENCOUNTER — HOSPITAL ENCOUNTER (EMERGENCY)
Facility: HOSPITAL | Age: 45
Discharge: HOME | End: 2020-04-01
Attending: EMERGENCY MEDICINE
Payer: COMMERCIAL

## 2020-04-01 VITALS
SYSTOLIC BLOOD PRESSURE: 153 MMHG | OXYGEN SATURATION: 98 % | WEIGHT: 189 LBS | RESPIRATION RATE: 20 BRPM | HEIGHT: 65 IN | HEART RATE: 90 BPM | TEMPERATURE: 99 F | BODY MASS INDEX: 31.49 KG/M2 | DIASTOLIC BLOOD PRESSURE: 88 MMHG

## 2020-04-01 DIAGNOSIS — O46.90 VAGINAL BLEEDING IN PREGNANCY: Primary | ICD-10-CM

## 2020-04-01 DIAGNOSIS — N92.6 VARIABLE MENSTRUAL CYCLE: ICD-10-CM

## 2020-04-01 LAB
ALBUMIN SERPL BCP-MCNC: 3.6 G/DL (ref 3.5–5.2)
ALP SERPL-CCNC: 56 U/L (ref 55–135)
ALT SERPL W/O P-5'-P-CCNC: 8 U/L (ref 10–44)
ANION GAP SERPL CALC-SCNC: 9 MMOL/L (ref 8–16)
AST SERPL-CCNC: 12 U/L (ref 10–40)
BASOPHILS # BLD AUTO: 0.01 K/UL (ref 0–0.2)
BASOPHILS NFR BLD: 0.1 % (ref 0–1.9)
BILIRUB SERPL-MCNC: 0.6 MG/DL (ref 0.1–1)
BUN SERPL-MCNC: 8 MG/DL (ref 6–20)
CALCIUM SERPL-MCNC: 9.7 MG/DL (ref 8.7–10.5)
CHLORIDE SERPL-SCNC: 107 MMOL/L (ref 95–110)
CO2 SERPL-SCNC: 27 MMOL/L (ref 23–29)
CREAT SERPL-MCNC: 0.9 MG/DL (ref 0.5–1.4)
DIFFERENTIAL METHOD: ABNORMAL
EOSINOPHIL # BLD AUTO: 0.1 K/UL (ref 0–0.5)
EOSINOPHIL NFR BLD: 1.4 % (ref 0–8)
ERYTHROCYTE [DISTWIDTH] IN BLOOD BY AUTOMATED COUNT: 13.2 % (ref 11.5–14.5)
EST. GFR  (AFRICAN AMERICAN): >60 ML/MIN/1.73 M^2
EST. GFR  (NON AFRICAN AMERICAN): >60 ML/MIN/1.73 M^2
GLUCOSE SERPL-MCNC: 106 MG/DL (ref 70–110)
HCG INTACT+B SERPL-ACNC: <1.2 MIU/ML
HCT VFR BLD AUTO: 35.3 % (ref 37–48.5)
HGB BLD-MCNC: 11.9 G/DL (ref 12–16)
IMM GRANULOCYTES # BLD AUTO: 0.02 K/UL (ref 0–0.04)
IMM GRANULOCYTES NFR BLD AUTO: 0.3 % (ref 0–0.5)
LYMPHOCYTES # BLD AUTO: 2.1 K/UL (ref 1–4.8)
LYMPHOCYTES NFR BLD: 29.9 % (ref 18–48)
MCH RBC QN AUTO: 28.8 PG (ref 27–31)
MCHC RBC AUTO-ENTMCNC: 33.7 G/DL (ref 32–36)
MCV RBC AUTO: 86 FL (ref 82–98)
MONOCYTES # BLD AUTO: 0.6 K/UL (ref 0.3–1)
MONOCYTES NFR BLD: 7.8 % (ref 4–15)
NEUTROPHILS # BLD AUTO: 4.3 K/UL (ref 1.8–7.7)
NEUTROPHILS NFR BLD: 60.5 % (ref 38–73)
NRBC BLD-RTO: 0 /100 WBC
PLATELET # BLD AUTO: 260 K/UL (ref 150–350)
PMV BLD AUTO: 11.9 FL (ref 9.2–12.9)
POTASSIUM SERPL-SCNC: 3.3 MMOL/L (ref 3.5–5.1)
PROT SERPL-MCNC: 7.6 G/DL (ref 6–8.4)
RBC # BLD AUTO: 4.13 M/UL (ref 4–5.4)
SODIUM SERPL-SCNC: 143 MMOL/L (ref 136–145)
WBC # BLD AUTO: 7.08 K/UL (ref 3.9–12.7)

## 2020-04-01 PROCEDURE — 84702 CHORIONIC GONADOTROPIN TEST: CPT

## 2020-04-01 PROCEDURE — 80053 COMPREHEN METABOLIC PANEL: CPT

## 2020-04-01 PROCEDURE — 85025 COMPLETE CBC W/AUTO DIFF WBC: CPT

## 2020-04-01 PROCEDURE — 99282 EMERGENCY DEPT VISIT SF MDM: CPT | Mod: 25
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
## 2020-04-01 NOTE — ED NOTES
Pt presents to the ED w/ c/ of vaginal bleeding. Pt reports that she is 12weeks pregnant. . Pt reports for the past 3 days she has had vaginal bleeding with clots. Reports going through 4 pads per hour. Pt also reports lower abd cramping and lower back pain.

## 2020-11-16 ENCOUNTER — HOSPITAL ENCOUNTER (EMERGENCY)
Facility: HOSPITAL | Age: 45
Discharge: HOME | End: 2020-11-16
Attending: EMERGENCY MEDICINE
Payer: MEDICAID

## 2020-11-16 VITALS
OXYGEN SATURATION: 97 % | WEIGHT: 209 LBS | SYSTOLIC BLOOD PRESSURE: 162 MMHG | HEIGHT: 65 IN | DIASTOLIC BLOOD PRESSURE: 86 MMHG | BODY MASS INDEX: 34.82 KG/M2 | HEART RATE: 94 BPM | RESPIRATION RATE: 19 BRPM | TEMPERATURE: 99 F

## 2020-11-16 DIAGNOSIS — M54.6 CHRONIC THORACIC BACK PAIN, UNSPECIFIED BACK PAIN LATERALITY: ICD-10-CM

## 2020-11-16 DIAGNOSIS — G44.209 TENSION HEADACHE: Primary | ICD-10-CM

## 2020-11-16 DIAGNOSIS — N61.0 MASTITIS: ICD-10-CM

## 2020-11-16 DIAGNOSIS — G89.29 CHRONIC THORACIC BACK PAIN, UNSPECIFIED BACK PAIN LATERALITY: ICD-10-CM

## 2020-11-16 PROCEDURE — 99283 EMERGENCY DEPT VISIT LOW MDM: CPT | Mod: ER

## 2020-11-16 RX ORDER — CEPHALEXIN 250 MG/1
250 CAPSULE ORAL 4 TIMES DAILY
Qty: 28 CAPSULE | Refills: 0 | Status: SHIPPED | OUTPATIENT
Start: 2020-11-16 | End: 2020-11-23

## 2020-11-17 NOTE — ED PROVIDER NOTES
"Encounter Date: 11/16/2020       History     Chief Complaint   Patient presents with    Multiple Medical Compliants     Reports to ED c c/o lower back pain x 3 days. Intermittent. Took ibuprofen 800 mg; denies relief. Denies injury or fall. Denies  symptoms     Breast Pain     Reports R breast pain x 2 weeks. States "i hit it and i got piercings and it hurt. i think it infected." +Drainage. +Swelling. Denies redness.     Headache     Reports HA x 4 days. Generalized. Pressure. +Blurry Vision.     Joint Swelling     Reports intermittent ankle swelling "always."      Patient is a 45-year-old female presents with a couple different complaints.  She has had a headache over the last 4 days she says that it is probably stress she has been under a lot of stress at home.  No treatment prior to coming him localizes the headache in the frontal area and describes a throbbing sensation.  No thunderclap presentation.  She has also her right breast over the last couple of days.  She does have an the nipple piercing in it and then remembers 8 hitting it on the bed pole about 2 weeks ago.  She has also had back pain for years.  She was supposed to see Dr. Mellissa Michelle this afternoon but Mr. appointment.  She does not like the way lisinopril makes her feel.  She said she used to be on hydrochlorothiazide.  She said that she had a normal mammogram this year.          Review of patient's allergies indicates:   Allergen Reactions    Vancomycin analogues Swelling     Past Medical History:   Diagnosis Date    Anxiety     Bell's palsy     Hypertension      Past Surgical History:   Procedure Laterality Date    BREAST SURGERY      CHOLECYSTECTOMY      COLONOSCOPY      TOTAL REDUCTION MAMMOPLASTY Bilateral 2007    TUBAL LIGATION       Family History   Problem Relation Age of Onset    Liver cancer Father      Social History     Tobacco Use    Smoking status: Never Smoker    Smokeless tobacco: Never Used   Substance Use " Topics    Alcohol use: No    Drug use: No     Review of Systems   Constitutional: Negative for fever.   HENT: Negative for sore throat.    Respiratory: Negative for shortness of breath.    Cardiovascular: Negative for chest pain.   Gastrointestinal: Negative for nausea.   Genitourinary: Negative for dysuria.   Musculoskeletal: Positive for back pain.   Skin: Positive for wound. Negative for rash.   Neurological: Positive for headaches. Negative for weakness.   Hematological: Does not bruise/bleed easily.       Physical Exam     Initial Vitals [11/16/20 2109]   BP Pulse Resp Temp SpO2   (!) 162/86 94 19 98.7 °F (37.1 °C) 97 %      MAP       --         Physical Exam    Constitutional: She appears well-developed and well-nourished. No distress.   HENT:   Head: Normocephalic and atraumatic.   Left Ear: Hearing normal.   Nose: Nose normal.   Mouth/Throat: Uvula is midline and oropharynx is clear and moist.   Eyes: Conjunctivae, EOM and lids are normal.   Neck: Trachea normal and normal range of motion. Neck supple.   Cardiovascular: Normal rate and regular rhythm.   Pulmonary/Chest:     Abdominal: Normal appearance.   Lymphadenopathy:     She has no cervical adenopathy.   Neurological: She is alert. GCS eye subscore is 4. GCS verbal subscore is 5. GCS motor subscore is 6.   Skin: Skin is warm and dry.   Psychiatric: She has a normal mood and affect. Her speech is normal and behavior is normal.         ED Course   Procedures  Labs Reviewed - No data to display       Imaging Results    None          Medical Decision Making:   Differential Diagnosis:   Differential diagnosis includes but is not limited to tension headache, breast abscess, lumbar strain  ED Management:  The patient was seen and examined.  I will put her on cephalexin.  She has a small breast cellulitis.  I do not palpate an abscess.  She is to contact her primary care provider tomorrow to reschedule her appointment.  She does have enough of her lisinopril.   I believe that this is a tension headache.  Neurologically alert.                             Clinical Impression:       ICD-10-CM ICD-9-CM   1. Tension headache  G44.209 307.81   2. Chronic thoracic back pain, unspecified back pain laterality  M54.6 724.1    G89.29 338.29   3. Mastitis  N61.0 611.0                 The patient was seen during the public health crisis due to COVID-19.  Personal protective equipment worn for this encounter included gloves mask and eye protection       Disposition:   Disposition: Discharged  Condition: Stable     ED Disposition Condition    Discharge Stable        ED Prescriptions     Medication Sig Dispense Start Date End Date Auth. Provider    cephALEXin (KEFLEX) 250 MG capsule Take 1 capsule (250 mg total) by mouth 4 (four) times daily. for 7 days 28 capsule 11/16/2020 11/23/2020 Marquise Deng DO        Follow-up Information     Follow up With Specialties Details Why Contact Info    Sallie Michelle MD ([facility]) Family Medicine Schedule an appointment as soon as possible for a visit   [address] [facility]  [place] [address]  [phone]                                         Marquise Deng DO  11/16/20 2126

## 2020-11-17 NOTE — DISCHARGE INSTRUCTIONS
Use a warm compress for 20 min 3 times a day over the nipple piercing.  Take the antibiotic until it is gone.  Contact her primary provider to reschedule your appointment.   
negative

## 2020-11-17 NOTE — ED NOTES
In room to dc pt. Pt requesting to speak to Dr. Deng regarding pain meds for her back. Dr. Deng in room informed pt we can not treat chronic pain through the ER. Pt advised to take 800mg ibuprofen prescription that she has at home and to follow up with her primary. Pt states understanding. Ambulates out of department with steady gait and no difficulty.

## 2020-12-24 ENCOUNTER — HOSPITAL ENCOUNTER (EMERGENCY)
Facility: HOSPITAL | Age: 45
Discharge: HOME | End: 2020-12-24
Attending: EMERGENCY MEDICINE
Payer: MEDICAID

## 2020-12-24 VITALS
HEIGHT: 66 IN | SYSTOLIC BLOOD PRESSURE: 137 MMHG | TEMPERATURE: 99 F | RESPIRATION RATE: 18 BRPM | BODY MASS INDEX: 32.95 KG/M2 | HEART RATE: 90 BPM | WEIGHT: 205 LBS | DIASTOLIC BLOOD PRESSURE: 90 MMHG | OXYGEN SATURATION: 100 %
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
## 2020-12-24 DIAGNOSIS — M25.519 SHOULDER PAIN: ICD-10-CM

## 2020-12-24 DIAGNOSIS — N61.0 MASTITIS: ICD-10-CM

## 2020-12-24 DIAGNOSIS — S83.91XA SPRAIN OF RIGHT KNEE, UNSPECIFIED LIGAMENT, INITIAL ENCOUNTER: ICD-10-CM

## 2020-12-24 DIAGNOSIS — N64.4 BREAST PAIN: Primary | ICD-10-CM

## 2020-12-24 DIAGNOSIS — S46.911A STRAIN OF RIGHT SHOULDER, INITIAL ENCOUNTER: ICD-10-CM

## 2020-12-24 LAB
ALBUMIN SERPL BCP-MCNC: 4.3 G/DL (ref 3.5–5.2)
ALP SERPL-CCNC: 70 U/L (ref 38–126)
ALT SERPL W/O P-5'-P-CCNC: 11 U/L (ref 10–44)
ANION GAP SERPL CALC-SCNC: 7 MMOL/L (ref 8–16)
AST SERPL-CCNC: 18 U/L (ref 15–46)
B-HCG UR QL: NEGATIVE
BASOPHILS # BLD AUTO: 0.02 K/UL (ref 0–0.2)
BASOPHILS NFR BLD: 0.3 % (ref 0–1.9)
BILIRUB SERPL-MCNC: 0.8 MG/DL (ref 0.1–1)
BILIRUB UR QL STRIP: NEGATIVE
CALCIUM SERPL-MCNC: 9.7 MG/DL (ref 8.7–10.5)
CHLORIDE SERPL-SCNC: 108 MMOL/L (ref 95–110)
CLARITY UR REFRACT.AUTO: ABNORMAL
CO2 SERPL-SCNC: 29 MMOL/L (ref 23–29)
COLOR UR AUTO: ABNORMAL
CREAT SERPL-MCNC: 0.9 MG/DL (ref 0.5–1.4)
DIFFERENTIAL METHOD: NORMAL
EOSINOPHIL # BLD AUTO: 0.1 K/UL (ref 0–0.5)
EOSINOPHIL NFR BLD: 1.6 % (ref 0–8)
ERYTHROCYTE [DISTWIDTH] IN BLOOD BY AUTOMATED COUNT: 13.1 % (ref 11.5–14.5)
EST. GFR  (AFRICAN AMERICAN): >60 ML/MIN/1.73 M^2
EST. GFR  (NON AFRICAN AMERICAN): >60 ML/MIN/1.73 M^2
GLUCOSE SERPL-MCNC: 118 MG/DL (ref 70–110)
GLUCOSE UR QL STRIP: NEGATIVE
HCT VFR BLD AUTO: 38.1 % (ref 37–48.5)
HGB BLD-MCNC: 13 G/DL (ref 12–16)
HGB UR QL STRIP: ABNORMAL
IMM GRANULOCYTES # BLD AUTO: 0.02 K/UL (ref 0–0.04)
IMM GRANULOCYTES NFR BLD AUTO: 0.3 % (ref 0–0.5)
KETONES UR QL STRIP: ABNORMAL
LACTATE SERPL-SCNC: 0.9 MMOL/L (ref 0.5–2.2)
LEUKOCYTE ESTERASE UR QL STRIP: ABNORMAL
LYMPHOCYTES # BLD AUTO: 2.1 K/UL (ref 1–4.8)
LYMPHOCYTES NFR BLD: 30.7 % (ref 18–48)
MCH RBC QN AUTO: 29.1 PG (ref 27–31)
MCHC RBC AUTO-ENTMCNC: 34.1 G/DL (ref 32–36)
MCV RBC AUTO: 85 FL (ref 82–98)
MICROSCOPIC COMMENT: NORMAL
MONOCYTES # BLD AUTO: 0.5 K/UL (ref 0.3–1)
MONOCYTES NFR BLD: 7.1 % (ref 4–15)
NEUTROPHILS # BLD AUTO: 4.1 K/UL (ref 1.8–7.7)
NEUTROPHILS NFR BLD: 60 % (ref 38–73)
NITRITE UR QL STRIP: NEGATIVE
NRBC BLD-RTO: 0 /100 WBC
PH UR STRIP: 7 [PH] (ref 5–8)
PLATELET # BLD AUTO: 258 K/UL (ref 150–350)
PMV BLD AUTO: 11.6 FL (ref 9.2–12.9)
POTASSIUM SERPL-SCNC: 3.7 MMOL/L (ref 3.5–5.1)
PROT SERPL-MCNC: 8.7 G/DL (ref 6–8.4)
PROT UR QL STRIP: ABNORMAL
RBC # BLD AUTO: 4.47 M/UL (ref 4–5.4)
RBC #/AREA URNS AUTO: 1 /HPF (ref 0–4)
SODIUM SERPL-SCNC: 144 MMOL/L (ref 136–145)
SP GR UR STRIP: 1.01 (ref 1–1.03)
SQUAMOUS #/AREA URNS AUTO: 25 /HPF
URN SPEC COLLECT METH UR: ABNORMAL
UROBILINOGEN UR STRIP-ACNC: ABNORMAL EU/DL
UUN UR-MCNC: 10 MG/DL (ref 7–17)
WBC # BLD AUTO: 6.88 K/UL (ref 3.9–12.7)
WBC #/AREA URNS AUTO: 1 /HPF (ref 0–5)

## 2020-12-24 PROCEDURE — 87040 BLOOD CULTURE FOR BACTERIA: CPT | Mod: 59,ER

## 2020-12-24 PROCEDURE — 83605 ASSAY OF LACTIC ACID: CPT | Mod: ER

## 2020-12-24 PROCEDURE — 25000003 PHARM REV CODE 250: Mod: ER | Performed by: PHYSICIAN ASSISTANT

## 2020-12-24 PROCEDURE — 96374 THER/PROPH/DIAG INJ IV PUSH: CPT | Mod: ER

## 2020-12-24 PROCEDURE — 81025 URINE PREGNANCY TEST: CPT | Mod: ER

## 2020-12-24 PROCEDURE — 80053 COMPREHEN METABOLIC PANEL: CPT | Mod: ER

## 2020-12-24 PROCEDURE — 63600175 PHARM REV CODE 636 W HCPCS: Mod: ER | Performed by: PHYSICIAN ASSISTANT

## 2020-12-24 PROCEDURE — 81000 URINALYSIS NONAUTO W/SCOPE: CPT | Mod: ER

## 2020-12-24 PROCEDURE — 99285 EMERGENCY DEPT VISIT HI MDM: CPT | Mod: 25,ER

## 2020-12-24 PROCEDURE — 85025 COMPLETE CBC W/AUTO DIFF WBC: CPT | Mod: ER

## 2020-12-24 RX ORDER — KETOROLAC TROMETHAMINE 10 MG/1
10 TABLET, FILM COATED ORAL EVERY 6 HOURS
Qty: 10 TABLET | Refills: 0 | OUTPATIENT
Start: 2020-12-24 | End: 2021-03-20

## 2020-12-24 RX ORDER — CLINDAMYCIN HYDROCHLORIDE 150 MG/1
300 CAPSULE ORAL
Status: COMPLETED | OUTPATIENT
Start: 2020-12-24 | End: 2020-12-24

## 2020-12-24 RX ORDER — CLINDAMYCIN HYDROCHLORIDE 150 MG/1
300 CAPSULE ORAL 4 TIMES DAILY
Qty: 56 CAPSULE | Refills: 0 | Status: SHIPPED | OUTPATIENT
Start: 2020-12-24 | End: 2020-12-31

## 2020-12-24 RX ORDER — KETOROLAC TROMETHAMINE 30 MG/ML
15 INJECTION, SOLUTION INTRAMUSCULAR; INTRAVENOUS
Status: COMPLETED | OUTPATIENT
Start: 2020-12-24 | End: 2020-12-24

## 2020-12-24 RX ADMIN — CLINDAMYCIN HYDROCHLORIDE 300 MG: 150 CAPSULE ORAL at 04:12

## 2020-12-24 RX ADMIN — KETOROLAC TROMETHAMINE 15 MG: 30 INJECTION, SOLUTION INTRAMUSCULAR at 03:12

## 2020-12-24 NOTE — DISCHARGE INSTRUCTIONS
Your x-rays did not reveal any evidence of fractures or dislocations.  There were some arthritic changes noted to your knee.  You are advised to follow-up with her primary care provider for re-evaluation within 7 days.  You are instructed to keep the appointment for your mammogram/ultrasound on 12/29/2020.  You are instructed to return to the emergency department immediately for any new or worsening symptoms.

## 2020-12-24 NOTE — ED NOTES
Breast tissue is red and swollen. Tender to palpation. No fever. C/o right knee and arm pain. No swelling or bruising noted. PMS intact.

## 2020-12-24 NOTE — ED NOTES
Pt reported while being assessed by provider that she is also having breast pain related to abscess she has had for 1 month. Pt has had 2 rounds of abx with no improvement. Acuity changed to 3 due to new complaint.

## 2020-12-29 LAB
BACTERIA BLD CULT: NORMAL
BACTERIA BLD CULT: NORMAL

## 2021-03-20 ENCOUNTER — HOSPITAL ENCOUNTER (EMERGENCY)
Facility: HOSPITAL | Age: 46
Discharge: HOME | End: 2021-03-20
Attending: EMERGENCY MEDICINE
Payer: MEDICAID

## 2021-03-20 VITALS
OXYGEN SATURATION: 99 % | BODY MASS INDEX: 33.49 KG/M2 | RESPIRATION RATE: 19 BRPM | WEIGHT: 201 LBS | DIASTOLIC BLOOD PRESSURE: 55 MMHG | HEART RATE: 89 BPM | HEIGHT: 65 IN | SYSTOLIC BLOOD PRESSURE: 118 MMHG | TEMPERATURE: 99 F

## 2021-03-20 DIAGNOSIS — N61.0 CELLULITIS OF RIGHT BREAST: Primary | ICD-10-CM

## 2021-03-20 PROCEDURE — 25000003 PHARM REV CODE 250: Mod: ER | Performed by: EMERGENCY MEDICINE

## 2021-03-20 PROCEDURE — 99284 EMERGENCY DEPT VISIT MOD MDM: CPT | Mod: ER,25

## 2021-03-20 RX ORDER — SULFAMETHOXAZOLE AND TRIMETHOPRIM 800; 160 MG/1; MG/1
1 TABLET ORAL 2 TIMES DAILY
Qty: 20 TABLET | Refills: 0 | Status: SHIPPED | OUTPATIENT
Start: 2021-03-20 | End: 2021-03-30

## 2021-03-20 RX ORDER — NAPROXEN SODIUM 220 MG/1
324 TABLET, FILM COATED ORAL
Status: DISCONTINUED | OUTPATIENT
Start: 2021-03-20 | End: 2021-03-20

## 2021-03-20 RX ORDER — KETOROLAC TROMETHAMINE 10 MG/1
10 TABLET, FILM COATED ORAL EVERY 8 HOURS PRN
Qty: 15 TABLET | Refills: 0 | Status: SHIPPED | OUTPATIENT
Start: 2021-03-20 | End: 2022-04-13 | Stop reason: CLARIF

## 2021-03-20 RX ORDER — KETOROLAC TROMETHAMINE 10 MG/1
10 TABLET, FILM COATED ORAL
Status: COMPLETED | OUTPATIENT
Start: 2021-03-20 | End: 2021-03-20

## 2021-03-20 RX ORDER — SULFAMETHOXAZOLE AND TRIMETHOPRIM 800; 160 MG/1; MG/1
1 TABLET ORAL
Status: COMPLETED | OUTPATIENT
Start: 2021-03-20 | End: 2021-03-20

## 2021-03-20 RX ADMIN — SULFAMETHOXAZOLE AND TRIMETHOPRIM 1 TABLET: 800; 160 TABLET ORAL at 08:03

## 2021-03-20 RX ADMIN — KETOROLAC TROMETHAMINE 10 MG: 10 TABLET, FILM COATED ORAL at 08:03

## 2021-05-19 ENCOUNTER — HOSPITAL ENCOUNTER (EMERGENCY)
Facility: HOSPITAL | Age: 46
Discharge: HOME | End: 2021-05-19
Attending: EMERGENCY MEDICINE
Payer: MEDICAID

## 2021-05-19 VITALS
SYSTOLIC BLOOD PRESSURE: 133 MMHG | TEMPERATURE: 98 F | OXYGEN SATURATION: 98 % | HEART RATE: 106 BPM | HEIGHT: 65 IN | BODY MASS INDEX: 37.99 KG/M2 | RESPIRATION RATE: 18 BRPM | DIASTOLIC BLOOD PRESSURE: 89 MMHG | WEIGHT: 228 LBS

## 2021-05-19 DIAGNOSIS — H69.93 EUSTACHIAN TUBE DYSFUNCTION, BILATERAL: Primary | ICD-10-CM

## 2021-05-19 DIAGNOSIS — J06.9 VIRAL UPPER RESPIRATORY INFECTION: ICD-10-CM

## 2021-05-19 LAB — SARS-COV-2 RDRP RESP QL NAA+PROBE: NEGATIVE

## 2021-05-19 PROCEDURE — 96372 THER/PROPH/DIAG INJ SC/IM: CPT | Mod: ER

## 2021-05-19 PROCEDURE — 63600175 PHARM REV CODE 636 W HCPCS: Mod: ER | Performed by: PHYSICIAN ASSISTANT

## 2021-05-19 PROCEDURE — U0002 COVID-19 LAB TEST NON-CDC: HCPCS | Mod: ER | Performed by: PHYSICIAN ASSISTANT

## 2021-05-19 PROCEDURE — 99284 EMERGENCY DEPT VISIT MOD MDM: CPT | Mod: 25,ER

## 2021-05-19 RX ORDER — PROMETHAZINE HYDROCHLORIDE AND DEXTROMETHORPHAN HYDROBROMIDE 6.25; 15 MG/5ML; MG/5ML
5 SYRUP ORAL 3 TIMES DAILY PRN
Qty: 118 ML | Refills: 0 | Status: SHIPPED | OUTPATIENT
Start: 2021-05-19 | End: 2021-05-29

## 2021-05-19 RX ORDER — DEXAMETHASONE SODIUM PHOSPHATE 4 MG/ML
8 INJECTION, SOLUTION INTRA-ARTICULAR; INTRALESIONAL; INTRAMUSCULAR; INTRAVENOUS; SOFT TISSUE
Status: COMPLETED | OUTPATIENT
Start: 2021-05-19 | End: 2021-05-19

## 2021-05-19 RX ORDER — PSEUDOEPHEDRINE HCL 120 MG/1
120 TABLET, FILM COATED, EXTENDED RELEASE ORAL 2 TIMES DAILY PRN
Qty: 20 TABLET | Refills: 0 | Status: SHIPPED | OUTPATIENT
Start: 2021-05-19 | End: 2021-05-29

## 2021-05-19 RX ADMIN — DEXAMETHASONE SODIUM PHOSPHATE 8 MG: 4 INJECTION, SOLUTION INTRAMUSCULAR; INTRAVENOUS at 02:05

## 2021-08-11 ENCOUNTER — OFFICE VISIT (OUTPATIENT)
Dept: PODIATRY | Facility: CLINIC | Age: 46
End: 2021-08-11
Payer: MEDICAID

## 2021-08-11 VITALS
SYSTOLIC BLOOD PRESSURE: 138 MMHG | BODY MASS INDEX: 37.99 KG/M2 | WEIGHT: 228 LBS | HEART RATE: 84 BPM | DIASTOLIC BLOOD PRESSURE: 82 MMHG | HEIGHT: 65 IN

## 2021-08-11 DIAGNOSIS — G89.29 CHRONIC PAIN OF LEFT ANKLE: Primary | ICD-10-CM

## 2021-08-11 DIAGNOSIS — E66.01 MORBID OBESITY: ICD-10-CM

## 2021-08-11 DIAGNOSIS — M20.11 VALGUS DEFORMITY OF BOTH GREAT TOES: ICD-10-CM

## 2021-08-11 DIAGNOSIS — M25.572 CHRONIC PAIN OF LEFT ANKLE: Primary | ICD-10-CM

## 2021-08-11 DIAGNOSIS — M25.473 ANKLE SWELLING, UNSPECIFIED LATERALITY: ICD-10-CM

## 2021-08-11 DIAGNOSIS — M20.12 VALGUS DEFORMITY OF BOTH GREAT TOES: ICD-10-CM

## 2021-08-11 PROCEDURE — 20605 INTERMEDIATE JOINT ASPIRATION/INJECTION: R ANKLE: ICD-10-PCS | Mod: 50,S$PBB,, | Performed by: PODIATRIST

## 2021-08-11 PROCEDURE — 20605 DRAIN/INJ JOINT/BURSA W/O US: CPT | Mod: PBBFAC,PN | Performed by: PODIATRIST

## 2021-08-11 PROCEDURE — 99999 PR PBB SHADOW E&M-EST. PATIENT-LVL IV: CPT | Mod: PBBFAC,,, | Performed by: PODIATRIST

## 2021-08-11 PROCEDURE — 99203 PR OFFICE/OUTPT VISIT, NEW, LEVL III, 30-44 MIN: ICD-10-PCS | Mod: 25,S$PBB,, | Performed by: PODIATRIST

## 2021-08-11 PROCEDURE — 99203 OFFICE O/P NEW LOW 30 MIN: CPT | Mod: 25,S$PBB,, | Performed by: PODIATRIST

## 2021-08-11 PROCEDURE — 99999 PR PBB SHADOW E&M-EST. PATIENT-LVL IV: ICD-10-PCS | Mod: PBBFAC,,, | Performed by: PODIATRIST

## 2021-08-11 PROCEDURE — 99214 OFFICE O/P EST MOD 30 MIN: CPT | Mod: PBBFAC,PN,25 | Performed by: PODIATRIST

## 2021-08-11 RX ORDER — POTASSIUM CHLORIDE 20 MEQ/1
20 TABLET, EXTENDED RELEASE ORAL DAILY
COMMUNITY
Start: 2021-08-09 | End: 2022-04-13 | Stop reason: CLARIF

## 2021-08-11 RX ORDER — ATORVASTATIN CALCIUM 20 MG/1
20 TABLET, FILM COATED ORAL NIGHTLY
COMMUNITY
Start: 2021-07-07 | End: 2022-04-13 | Stop reason: CLARIF

## 2021-08-11 RX ORDER — TRIAMCINOLONE ACETONIDE 40 MG/ML
40 INJECTION, SUSPENSION INTRA-ARTICULAR; INTRAMUSCULAR
Status: COMPLETED | OUTPATIENT
Start: 2021-08-11 | End: 2021-08-11

## 2021-08-11 RX ORDER — ATENOLOL 25 MG/1
25 TABLET ORAL DAILY
COMMUNITY
Start: 2021-07-23 | End: 2022-04-13 | Stop reason: CLARIF

## 2021-08-11 RX ORDER — AMOXICILLIN 500 MG/1
500 TABLET, FILM COATED ORAL EVERY 6 HOURS
COMMUNITY
Start: 2021-07-29 | End: 2022-04-13 | Stop reason: CLARIF

## 2021-08-11 RX ADMIN — Medication 32 MG: at 09:08

## 2021-08-11 RX ADMIN — TRIAMCINOLONE ACETONIDE 40 MG: 40 INJECTION, SUSPENSION INTRA-ARTICULAR; INTRAMUSCULAR at 08:08

## 2021-08-13 RX ORDER — TRIAMCINOLONE ACETONIDE 40 MG/ML
40 INJECTION, SUSPENSION INTRA-ARTICULAR; INTRAMUSCULAR
Status: DISCONTINUED | OUTPATIENT
Start: 2021-08-13 | End: 2021-08-13

## 2022-04-13 ENCOUNTER — HOSPITAL ENCOUNTER (EMERGENCY)
Facility: HOSPITAL | Age: 47
Discharge: HOME | End: 2022-04-13
Attending: EMERGENCY MEDICINE
Payer: MEDICAID

## 2022-04-13 VITALS
WEIGHT: 216 LBS | RESPIRATION RATE: 15 BRPM | BODY MASS INDEX: 35.94 KG/M2 | OXYGEN SATURATION: 98 % | SYSTOLIC BLOOD PRESSURE: 125 MMHG | HEART RATE: 91 BPM | TEMPERATURE: 99 F | DIASTOLIC BLOOD PRESSURE: 67 MMHG

## 2022-04-13 DIAGNOSIS — S39.012A BACK STRAIN, INITIAL ENCOUNTER: Primary | ICD-10-CM

## 2022-04-13 DIAGNOSIS — S39.012A STRAIN OF LUMBAR REGION, INITIAL ENCOUNTER: ICD-10-CM
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
## 2022-04-13 LAB — B-HCG UR QL: NEGATIVE

## 2022-04-13 PROCEDURE — 81025 URINE PREGNANCY TEST: CPT | Mod: ER | Performed by: PHYSICIAN ASSISTANT

## 2022-04-13 PROCEDURE — 63600175 PHARM REV CODE 636 W HCPCS: Mod: ER | Performed by: PHYSICIAN ASSISTANT

## 2022-04-13 PROCEDURE — 96372 THER/PROPH/DIAG INJ SC/IM: CPT | Performed by: PHYSICIAN ASSISTANT

## 2022-04-13 PROCEDURE — 99284 EMERGENCY DEPT VISIT MOD MDM: CPT | Mod: 25,ER

## 2022-04-13 RX ORDER — KETOROLAC TROMETHAMINE 30 MG/ML
30 INJECTION, SOLUTION INTRAMUSCULAR; INTRAVENOUS
Status: COMPLETED | OUTPATIENT
Start: 2022-04-13 | End: 2022-04-13

## 2022-04-13 RX ORDER — NAPROXEN 500 MG/1
500 TABLET ORAL 2 TIMES DAILY WITH MEALS
Qty: 12 TABLET | Refills: 0 | Status: SHIPPED | OUTPATIENT
Start: 2022-04-13 | End: 2022-04-13

## 2022-04-13 RX ORDER — METHOCARBAMOL 500 MG/1
500 TABLET, FILM COATED ORAL 3 TIMES DAILY
Qty: 15 TABLET | Refills: 0 | Status: SHIPPED | OUTPATIENT
Start: 2022-04-13 | End: 2022-04-13 | Stop reason: SDUPTHER

## 2022-04-13 RX ORDER — KETOROLAC TROMETHAMINE 10 MG/1
10 TABLET, FILM COATED ORAL EVERY 6 HOURS
Qty: 10 TABLET | Refills: 0 | Status: SHIPPED | OUTPATIENT
Start: 2022-04-13 | End: 2022-06-09

## 2022-04-13 RX ORDER — METHOCARBAMOL 500 MG/1
500 TABLET, FILM COATED ORAL 3 TIMES DAILY
Qty: 15 TABLET | Refills: 0 | Status: SHIPPED | OUTPATIENT
Start: 2022-04-13 | End: 2022-04-18

## 2022-04-13 RX ADMIN — KETOROLAC TROMETHAMINE 30 MG: 30 INJECTION, SOLUTION INTRAMUSCULAR at 04:04

## 2022-04-13 NOTE — DISCHARGE INSTRUCTIONS
Your x-rays did not reveal any evidence of fractures or dislocations at this time.  Some degenerative changes and mild curvature were noted in your upper and lower spine.  You are advised to follow-up with your primary care provider for re-evaluation and to return to the emergency department immediately for any new or worsening symptoms.
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
## 2022-04-13 NOTE — ED NOTES
Bed: Exam 02  Expected date:   Expected time:   Means of arrival:   Comments:     Alicia Gregorio PA-C  04/13/22 9099

## 2022-06-09 ENCOUNTER — HOSPITAL ENCOUNTER (EMERGENCY)
Facility: HOSPITAL | Age: 47
Discharge: HOME | End: 2022-06-09
Payer: MEDICAID

## 2022-06-09 VITALS
OXYGEN SATURATION: 98 % | HEIGHT: 65 IN | WEIGHT: 203 LBS | BODY MASS INDEX: 33.82 KG/M2 | TEMPERATURE: 98 F | SYSTOLIC BLOOD PRESSURE: 131 MMHG | HEART RATE: 78 BPM | DIASTOLIC BLOOD PRESSURE: 77 MMHG | RESPIRATION RATE: 17 BRPM

## 2022-06-09 DIAGNOSIS — S80.01XA CONTUSION OF RIGHT KNEE, INITIAL ENCOUNTER: ICD-10-CM

## 2022-06-09 DIAGNOSIS — S16.1XXA STRAIN OF CERVICAL PORTION OF RIGHT TRAPEZIUS MUSCLE: ICD-10-CM
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
## 2022-06-09 DIAGNOSIS — R07.89 CHEST WALL PAIN: ICD-10-CM

## 2022-06-09 DIAGNOSIS — S70.01XA CONTUSION OF RIGHT HIP, INITIAL ENCOUNTER: ICD-10-CM

## 2022-06-09 LAB — B-HCG UR QL: NEGATIVE

## 2022-06-09 PROCEDURE — 81025 URINE PREGNANCY TEST: CPT | Mod: ER

## 2022-06-09 PROCEDURE — 25000003 PHARM REV CODE 250: Mod: ER

## 2022-06-09 PROCEDURE — 99285 EMERGENCY DEPT VISIT HI MDM: CPT | Mod: 25,ER

## 2022-06-09 RX ORDER — METHOCARBAMOL 500 MG/1
1000 TABLET, FILM COATED ORAL 3 TIMES DAILY PRN
Qty: 18 TABLET | Refills: 0 | Status: SHIPPED | OUTPATIENT
Start: 2022-06-09

## 2022-06-09 RX ORDER — METHOCARBAMOL 500 MG/1
1000 TABLET, FILM COATED ORAL
Status: COMPLETED | OUTPATIENT
Start: 2022-06-09 | End: 2022-06-09

## 2022-06-09 RX ORDER — NAPROXEN 500 MG/1
500 TABLET ORAL 2 TIMES DAILY PRN
Qty: 20 TABLET | Refills: 0 | Status: SHIPPED | OUTPATIENT
Start: 2022-06-09

## 2022-06-09 RX ORDER — HYDROCODONE BITARTRATE AND ACETAMINOPHEN 7.5; 325 MG/1; MG/1
1 TABLET ORAL EVERY 6 HOURS PRN
Qty: 12 TABLET | Refills: 0 | Status: SHIPPED | OUTPATIENT
Start: 2022-06-09

## 2022-06-09 RX ORDER — HYDROCODONE BITARTRATE AND ACETAMINOPHEN 5; 325 MG/1; MG/1
2 TABLET ORAL
Status: COMPLETED | OUTPATIENT
Start: 2022-06-09 | End: 2022-06-09

## 2022-06-09 RX ADMIN — HYDROCODONE BITARTRATE AND ACETAMINOPHEN 2 TABLET: 5; 325 TABLET ORAL at 03:06

## 2022-06-09 RX ADMIN — METHOCARBAMOL 1000 MG: 500 TABLET ORAL at 03:06
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
## 2022-06-09 NOTE — Clinical Note
"Aaliyah NUNEZ" Demetrius was seen and treated in our emergency department on 6/9/2022.  She may return to work on 06/11/2022.       If you have any questions or concerns, please don't hesitate to call.      JOSE R Reyes RN    "
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
## 2022-09-08 ENCOUNTER — TELEPHONE (OUTPATIENT)
Dept: PODIATRY | Facility: CLINIC | Age: 47
End: 2022-09-08
Payer: MEDICAID

## 2022-09-08 NOTE — TELEPHONE ENCOUNTER
----- Message from Vaishali Lozano sent at 9/8/2022  9:15 AM CDT -----  Contact: [phone]  Type:  Needs Medical Advice    Who Called: Pt  Reason for call: regarding the name of the Injection the Dr gave her in her feet on her last visit  Pharmacy name and phone #:  N/A  Would the patient rather a call back or a response via [facility]? Call back  Best Call Back Number: [phone]

## 2022-09-08 NOTE — TELEPHONE ENCOUNTER
Informed patient that she was given medication to numb the area before her aspiration. She gave verbal understanding.

## 2022-09-13 ENCOUNTER — TELEPHONE (OUTPATIENT)
Dept: NEUROLOGY | Facility: HOSPITAL | Age: 47
End: 2022-09-13
Payer: MEDICAID

## 2022-09-13 NOTE — TELEPHONE ENCOUNTER
Clinic appt scheduled with pt on 9/26/22 at 115pm from referral.  Pt given clinic address and repeated correctly.

## 2023-01-04 ENCOUNTER — TELEPHONE (OUTPATIENT)
Dept: HEMATOLOGY/ONCOLOGY | Facility: CLINIC | Age: 48
End: 2023-01-04
Payer: MEDICAID

## 2023-03-06 ENCOUNTER — HOSPITAL ENCOUNTER (EMERGENCY)
Facility: HOSPITAL | Age: 48
Discharge: HOME | End: 2023-03-06
Attending: EMERGENCY MEDICINE
Payer: MEDICAID

## 2023-03-06 VITALS
RESPIRATION RATE: 20 BRPM | OXYGEN SATURATION: 100 % | SYSTOLIC BLOOD PRESSURE: 170 MMHG | HEART RATE: 87 BPM | DIASTOLIC BLOOD PRESSURE: 75 MMHG | BODY MASS INDEX: 30.79 KG/M2 | TEMPERATURE: 99 F | WEIGHT: 185 LBS

## 2023-03-06 DIAGNOSIS — U07.1 COVID: Primary | ICD-10-CM

## 2023-03-06 PROCEDURE — 99282 EMERGENCY DEPT VISIT SF MDM: CPT | Mod: ER

## 2023-03-06 NOTE — Clinical Note
"Nathen BOLESANDREW Romo was seen and treated in our emergency department on 3/6/2023.  She may return to work on 03/08/2023.       If you have any questions or concerns, please don't hesitate to call.      DANE Cuevas"

## 2023-03-07 NOTE — DISCHARGE INSTRUCTIONS
Tylenol or motrin as needed for fever  Keep hydrated with powerade, gatorade or pedialyte  No school/work until fever free for 24 hours without medication  Quarantine until Wednesday and then wear a mask for 5 days  Clean all door handles and common counter services

## 2023-03-07 NOTE — ED PROVIDER NOTES
Encounter Date: 3/6/2023       History     Chief Complaint   Patient presents with    COVID-19 Concerns     Pt comes in today with shortness of breath, body aches, cough and fever. She had an at home positive COVID test.     46 y/o female which presents to the ED for evaluation after having a positive home covid test today. She states she began with congestion, cough and body aches on Friday. She denies fevers today but had hot/cold flashes over the weekend.     The history is provided by the patient.   Review of patient's allergies indicates:   Allergen Reactions    Vancomycin analogues Swelling     Past Medical History:   Diagnosis Date    Anxiety     Bell's palsy     Hypertension      Past Surgical History:   Procedure Laterality Date    BREAST SURGERY      CHOLECYSTECTOMY      COLONOSCOPY      gastric sleeve      HERNIA REPAIR      TOTAL REDUCTION MAMMOPLASTY Bilateral 2007    TUBAL LIGATION       Family History   Problem Relation Age of Onset    Liver cancer Father      Social History     Tobacco Use    Smoking status: Never    Smokeless tobacco: Never   Substance Use Topics    Alcohol use: No    Drug use: No     Review of Systems   Constitutional:  Positive for chills and fever.   HENT:  Positive for congestion. Negative for sore throat.    Respiratory:  Positive for cough. Negative for shortness of breath.    Cardiovascular:  Negative for chest pain.   Gastrointestinal:  Negative for nausea.   Genitourinary:  Negative for dysuria.   Musculoskeletal:  Positive for myalgias. Negative for back pain.   Skin:  Negative for rash.   Neurological:  Negative for weakness.   Hematological:  Does not bruise/bleed easily.   All other systems reviewed and are negative.    Physical Exam     Initial Vitals [03/06/23 1929]   BP Pulse Resp Temp SpO2   (!) 170/75 87 20 98.8 °F (37.1 °C) 100 %      MAP       --         Physical Exam    Nursing note and vitals reviewed.  Constitutional: She appears well-developed and  well-nourished.   HENT:   Head: Normocephalic and atraumatic.   Eyes: Conjunctivae and EOM are normal. Pupils are equal, round, and reactive to light.   Neck:   Normal range of motion.  Cardiovascular:  Normal rate, regular rhythm, normal heart sounds and intact distal pulses.     Exam reveals no gallop and no friction rub.       No murmur heard.  Pulmonary/Chest: Breath sounds normal. No respiratory distress. She has no wheezes. She has no rhonchi. She has no rales. She exhibits no tenderness.   Musculoskeletal:         General: No edema. Normal range of motion.      Cervical back: Normal range of motion.     Neurological: She is alert and oriented to person, place, and time. She has normal strength. GCS score is 15. GCS eye subscore is 4. GCS verbal subscore is 5. GCS motor subscore is 6.   Skin: Skin is warm. Capillary refill takes less than 2 seconds. No rash noted.   Psychiatric: She has a normal mood and affect.       ED Course   Procedures  Labs Reviewed - No data to display       Imaging Results    None          Medications - No data to display  Medical Decision Making:   Initial Assessment:   46 y/o female which presents with a positive home covid test.   Differential Diagnosis:   COVID, pneumonia, body aches, fatigue  ED Management:  Pt examined and does NOT have any respiratory distress. COVID positive home test. Patient given strict return precautions and voiced understanding of all discharge instructions. Pt was stable at discharge.              ED Course as of 03/07/23 1524   Mon Mar 06, 2023   2003 BP(!): 170/75 [AT]   2003 Temp: 98.8 °F (37.1 °C) [AT]   2003 Temp Source: Oral [AT]   2003 Pulse: 87 [AT]   2003 SpO2: 100 % [AT]   2003 Resp: 20 [AT]      ED Course User Index  [AT] DANE Cuevas                 Clinical Impression:   Final diagnoses:  [U07.1] COVID (Primary)        ED Disposition Condition    Discharge Stable          ED Prescriptions    None       Follow-up Information     None          Amy Devine, [facility]  03/07/23 1524

## 2024-02-08 ENCOUNTER — TELEPHONE (OUTPATIENT)
Dept: GASTROENTEROLOGY | Facility: CLINIC | Age: 49
End: 2024-02-08
Payer: MEDICAID

## 2024-02-08 NOTE — TELEPHONE ENCOUNTER
2 attempts to reach pt to schedule appt with Dr. William for anemia/colonoscopy.  Message placed on voicemail.

## 2024-02-16 ENCOUNTER — TELEPHONE (OUTPATIENT)
Dept: GASTROENTEROLOGY | Facility: CLINIC | Age: 49
End: 2024-02-16
Payer: MEDICAID

## 2024-12-18 ENCOUNTER — HOSPITAL ENCOUNTER (EMERGENCY)
Facility: HOSPITAL | Age: 49
Discharge: HOME | End: 2024-12-18
Attending: EMERGENCY MEDICINE

## 2024-12-18 VITALS
TEMPERATURE: 99 F | SYSTOLIC BLOOD PRESSURE: 148 MMHG | BODY MASS INDEX: 29.66 KG/M2 | HEIGHT: 65 IN | HEART RATE: 84 BPM | DIASTOLIC BLOOD PRESSURE: 78 MMHG | WEIGHT: 178 LBS | RESPIRATION RATE: 20 BRPM | OXYGEN SATURATION: 98 %

## 2024-12-18 DIAGNOSIS — M25.551 RIGHT HIP PAIN: ICD-10-CM

## 2024-12-18 DIAGNOSIS — M25.561 KNEE PAIN, RIGHT: ICD-10-CM

## 2024-12-18 PROCEDURE — 99284 EMERGENCY DEPT VISIT MOD MDM: CPT | Mod: 25,ER

## 2024-12-19 NOTE — FIRST PROVIDER EVALUATION
Emergency Department TeleTriage Encounter Note      CHIEF COMPLAINT    Chief Complaint   Patient presents with    Leg Injury     Patient reports a [employer] employee dropped a crate of food on her right upper leg last night.        VITAL SIGNS   Initial Vitals [12/18/24 1952]   BP Pulse Resp Temp SpO2   (!) 160/76 84 16 98.2 °F (36.8 °C) 100 %      MAP       --            ALLERGIES    Review of patient's allergies indicates:   Allergen Reactions    Vancomycin analogues Swelling       PROVIDER TRIAGE NOTE  TeleTriage Note: Aaliyah Romo, a nontoxic/well appearing, 49 y.o. female, presented to the ED with c/o a crate filled with food fell on her leg yesterday while she was at work. Pt states she has swelling to the outer part of her right thigh.     All ED beds are full at present; patient notified of this status.  Patient seen and medically screened by Nurse Practitioner via teletriage. Orders initiated at triage to expedite care.  Patient is stable to return to the waiting room and will be placed in an ED bed when available.  Care will be transferred to an alternate provider when patient has been placed in an Exam Room from the [facility] for physical exam, additional orders, and disposition.  7:57 PM Amy Devine DNP, FNP-C        ORDERS  Labs Reviewed - No data to display    ED Orders (720h ago, onward)      None              Virtual Visit Note: The provider triage portion of this emergency department evaluation and documentation was performed via [employer], a HIPAA-compliant telemedicine application, in concert with a tele-presenter in the room. A face to face patient evaluation with one of my colleagues will occur once the patient is placed in an emergency department room.      DISCLAIMER: This note was prepared with [employer] voice recognition transcription software. Garbled syntax, mangled pronouns, and other bizarre constructions may be attributed to that software system.

## 2024-12-19 NOTE — ED NOTES
Pt phone  found after discharged, nurse attempted to notify the patient via phone, unsuccessful. Red phone wire with white block left at the security desk, with the patient label on the bag.

## 2024-12-19 NOTE — ED PROVIDER NOTES
ED Provider Note - 12/18/2024    History     Chief Complaint   Patient presents with    Leg Injury     Patient reports a [employer] employee dropped a crate of food on her right upper leg last night.      Patient currently presents with a chief complaint of injury to the right hip and knee.  This was acquired yesterday PM as a result of a crate of food falling against her leg.  Patient notes associated symptoms of pain.  Denies associated swelling, bruising, numbness, deformity, abrasion, laceration, and loss of ROM.      Review of patient's allergies indicates:   Allergen Reactions    Vancomycin analogues Swelling     Past Medical History:   Diagnosis Date    Anxiety     Bell's palsy     Hypertension      Past Surgical History:   Procedure Laterality Date    BREAST SURGERY      CHOLECYSTECTOMY      COLONOSCOPY      gastric sleeve      HERNIA REPAIR      TOTAL REDUCTION MAMMOPLASTY Bilateral 2007    TUBAL LIGATION       Family History   Problem Relation Name Age of Onset    Liver cancer Father       Social History     Tobacco Use    Smoking status: Never    Smokeless tobacco: Never   Substance Use Topics    Alcohol use: No    Drug use: No     Review of Systems   Constitutional:  Negative for chills and fever.   HENT:  Negative for congestion and rhinorrhea.    Respiratory:  Negative for cough and shortness of breath.    Cardiovascular:  Negative for chest pain and palpitations.   Gastrointestinal:  Negative for abdominal pain, diarrhea and vomiting.   Genitourinary:  Negative for difficulty urinating and dysuria.   Skin:  Negative for color change and rash.   Neurological:  Negative for dizziness and light-headedness.   Hematological:  Negative for adenopathy. Does not bruise/bleed easily.       Physical Exam     Initial Vitals [12/18/24 1952]   BP Pulse Resp Temp SpO2   (!) 160/76 84 16 98.2 °F (36.8 °C) 100 %      MAP       --         Vitals:    12/18/24 1951 12/18/24 1952 12/18/24 2318   BP:  (!) 160/76 (!) 148/78  "  Pulse:  84 84   Resp:  16 20   Temp:  98.2 °F (36.8 °C) 98.8 °F (37.1 °C)   TempSrc: Oral Oral    SpO2:  100% 98%   Weight: 80.7 kg (178 lb)     Height:  5' 5" (1.651 m)      Physical Exam    Nursing note and vitals reviewed.  Constitutional: She appears well-developed and well-nourished. She is not diaphoretic. No distress.   HENT:   Head: Normocephalic and atraumatic.   Nose: Nose normal. Mouth/Throat: Oropharynx is clear and moist.   Eyes: Conjunctivae are normal. No scleral icterus.   Cardiovascular:  Normal rate, regular rhythm and intact distal pulses.           Pulmonary/Chest: No respiratory distress.   Musculoskeletal:         General: No edema. Normal range of motion.      Right hip: Tenderness and bony tenderness present. No deformity, lacerations or crepitus. Normal range of motion. Normal strength.      Right knee: Bony tenderness present. No swelling, deformity, effusion, ecchymosis, lacerations or crepitus. Normal range of motion. Tenderness present. Normal alignment. Normal pulse.     Neurological: She is alert and oriented to person, place, and time.   Skin: Skin is warm and dry.       ED Course   Procedures                   MDM  Differential Diagnoses   Based on available history, the working differential diagnoses considered during this evaluation include but are not limited to sprain/strain, contusion, fracture, dislocation.      LABS     Labs Reviewed - No data to display             Imaging     Imaging Results              X-Ray Hip 2 or 3 views Right with Pelvis when performed (Final result)  Result time 12/18/24 22:56:12      Final result by Charles Fink MD (12/18/24 22:56:12)                   Impression:      No acute abnormality.      Electronically signed by: Charles Fink  Date:    12/18/2024  Time:    22:56               Narrative:    EXAMINATION:  XR HIP WITH PELVIS WHEN PERFORMED 2 OR 3 VIEWS RIGHT    CLINICAL HISTORY:  XR HIP WITH PELVIS WHEN PERFORMED 2 OR 3 VIEWS RIGHTPain in " right hip    COMPARISON:  None    FINDINGS:  Multiple radiographic views  were obtained.    No evidence of acute fracture or dislocation.  Bony mineralization is normal.  Soft tissues are unremarkable.                                       X-Ray Knee 3 View Right (Final result)  Result time 12/18/24 22:55:40      Final result by Charles Fink MD (12/18/24 22:55:40)                   Impression:      No acute fracture or dislocation      Electronically signed by: Charles Fink  Date:    12/18/2024  Time:    22:55               Narrative:    EXAMINATION:  XR KNEE 3 VIEW RIGHT    CLINICAL HISTORY:  Pain in right knee    TECHNIQUE:  AP, lateral, and Merchant views of the right knee were performed.    COMPARISON:  None    FINDINGS:  No acute fracture or dislocation.  Normal soft tissues.  Normal bone mineral density.  Mild degenerative joint disease.                                       X-Rays:   Independently Interpreted Readings:   Other Readings:  X-ray of the right hip and knee shows no evidence of fracture or subluxation.       EKG        ED Management/Discussion   Medications - No data to display              The patient's list of active medical problems, social history, medications, and allergies as documented per RN staff has been reviewed.               On final assessment, the patient appears suitable for discharge.  Patient is ambulating without difficulty.  I see no gross evidence of injury on examination.  Imaging is unremarkable.    I see no indication of an emergent process beyond that addressed during our encounter but have duly counseled the patient/family regarding the need for prompt follow-up as well as the indications that should prompt immediate return to the emergency room.  The patient/family has been provided with language -specific verbal and printed direction regarding our final diagnosis(es) as well as instructions regarding use of OTC and/or Rx medications intended to manage the patient's  aforementioned conditions including:  ED Prescriptions    None           Patient has been advised of the following recommended follow-up instructions:  Follow-up Information       Follow up With Specialties Details Why Contact Info    PCP  Schedule an appointment as soon as possible for a visit  for reassessment     [facility] Emergency Dept Emergency Medicine Go to  As needed, If symptoms worsen [address] [facility]  Emergency Department  [facility] [address] [phone]          The patient/family communicates understanding of all this information and all remaining questions and concerns were addressed at this time.      Referrals:  No orders of the defined types were placed in this encounter.      CLINICAL IMPRESSION    ICD-10-CM ICD-9-CM   1. Right hip pain  M25.551 719.45   2. Knee pain, right  M25.561 719.46          ED Disposition Condition    Discharge Stable                 Geovany Valencia MD  12/19/24 [id number]